# Patient Record
Sex: FEMALE | Race: WHITE | NOT HISPANIC OR LATINO | Employment: FULL TIME | URBAN - METROPOLITAN AREA
[De-identification: names, ages, dates, MRNs, and addresses within clinical notes are randomized per-mention and may not be internally consistent; named-entity substitution may affect disease eponyms.]

---
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
## 2019-07-05 ENCOUNTER — HOSPITAL ENCOUNTER (EMERGENCY)
Facility: CLINIC | Age: 19
Discharge: HOME | End: 2019-07-05
Attending: FAMILY MEDICINE | Admitting: FAMILY MEDICINE
Payer: COMMERCIAL

## 2019-07-05 ENCOUNTER — APPOINTMENT (OUTPATIENT)
Dept: CT IMAGING | Facility: CLINIC | Age: 19
End: 2019-07-05
Attending: FAMILY MEDICINE
Payer: COMMERCIAL

## 2019-07-05 VITALS
HEART RATE: 63 BPM | DIASTOLIC BLOOD PRESSURE: 80 MMHG | SYSTOLIC BLOOD PRESSURE: 120 MMHG | OXYGEN SATURATION: 100 % | RESPIRATION RATE: 12 BRPM | TEMPERATURE: 97 F

## 2019-07-05 DIAGNOSIS — S06.0X0A CONCUSSION WITHOUT LOSS OF CONSCIOUSNESS, INITIAL ENCOUNTER: ICD-10-CM

## 2019-07-05 PROCEDURE — 70450 CT HEAD/BRAIN W/O DYE: CPT

## 2019-07-05 PROCEDURE — 99284 EMERGENCY DEPT VISIT MOD MDM: CPT | Mod: 25 | Performed by: FAMILY MEDICINE

## 2019-07-05 PROCEDURE — 99284 EMERGENCY DEPT VISIT MOD MDM: CPT | Mod: Z6 | Performed by: FAMILY MEDICINE
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
## 2019-07-05 NOTE — ED AVS SNAPSHOT
[facility]  [address] [place] [address]  Phone:  [phone]  Fax:  [phone]                                    Mercedes Watts   MRN: [id number]    Department:  [facility]   Date of Visit:  7/5/2019           After Visit Summary Signature Page    I have received my discharge instructions, and my questions have been answered. I have discussed any challenges I see with this plan with the nurse or doctor.    ..........................................................................................................................................  Patient/Patient Representative Signature      ..........................................................................................................................................  Patient Representative Print Name and Relationship to Patient    ..................................................               ................................................  Date                                   Time    ..........................................................................................................................................  Reviewed by Signature/Title    ...................................................              ..............................................  Date                                               Time          22EPIC Rev 08/18
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
## 2019-07-23 ENCOUNTER — OFFICE VISIT (OUTPATIENT)
Dept: FAMILY MEDICINE | Facility: CLINIC | Age: 19
End: 2019-07-23
Payer: COMMERCIAL

## 2019-07-23 VITALS
WEIGHT: 139.7 LBS | OXYGEN SATURATION: 99 % | SYSTOLIC BLOOD PRESSURE: 116 MMHG | BODY MASS INDEX: 25.71 KG/M2 | RESPIRATION RATE: 22 BRPM | HEIGHT: 62 IN | TEMPERATURE: 98.7 F | HEART RATE: 110 BPM | DIASTOLIC BLOOD PRESSURE: 80 MMHG

## 2019-07-23 DIAGNOSIS — N94.6 DYSMENORRHEA: Primary | ICD-10-CM

## 2019-07-23 DIAGNOSIS — L70.9 ACNE, UNSPECIFIED ACNE TYPE: ICD-10-CM

## 2019-07-23 PROCEDURE — 99213 OFFICE O/P EST LOW 20 MIN: CPT | Performed by: NURSE PRACTITIONER

## 2019-07-23 RX ORDER — NORGESTIMATE AND ETHINYL ESTRADIOL 7DAYSX3 28
1 KIT ORAL DAILY
Qty: 84 TABLET | Refills: 4 | Status: SHIPPED | OUTPATIENT
Start: 2019-07-23 | End: 2020-08-17

## 2019-07-23 ASSESSMENT — MIFFLIN-ST. JEOR: SCORE: 1356.93

## 2019-07-23 ASSESSMENT — PAIN SCALES - GENERAL: PAINLEVEL: NO PAIN (0)

## 2019-07-23 NOTE — PROGRESS NOTES
"Subjective     Mercedes Watts is a 19 year old female who presents to clinic today for the following health issues:    HPI   Discuss starting on birth control for acne. Breakout increasing. Interested in oral tablets  She states her periods have also become heavier with increased cramping.  She presently denies sexual activity, has no concern for pregnancy.    BP Readings from Last 3 Encounters:   07/23/19 116/80   07/05/19 120/80   08/23/18 124/75    Wt Readings from Last 3 Encounters:   07/23/19 63.4 kg (139 lb 11.2 oz)   08/15/18 61.6 kg (135 lb 11.2 oz) (64 %)*     * Growth percentiles are based on CDC (Girls, 2-20 Years) data.                    Reviewed and updated as needed this visit by Provider         Review of Systems   ROS COMP: Constitutional, HEENT, cardiovascular, pulmonary, gi and gu systems are negative, except as otherwise noted.      Objective    /80   Pulse 110   Temp 98.7  F (37.1  C) (Temporal)   Resp 22   Ht 1.575 m (5' 2\")   Wt 63.4 kg (139 lb 11.2 oz)   SpO2 99%   BMI 25.55 kg/m    Body mass index is 25.55 kg/m .  Physical Exam   GENERAL: healthy, alert and no distress  NECK: no adenopathy, no asymmetry, masses, or scars and thyroid normal to palpation  RESP: lungs clear to auscultation - no rales, rhonchi or wheezes  CV: regular rate and rhythm, normal S1 S2, no S3 or S4, no murmur, click or rub, no peripheral edema and peripheral pulses strong  ABDOMEN: soft, nontender, no hepatosplenomegaly, no masses and bowel sounds normal  MS: no gross musculoskeletal defects noted, no edema    Diagnostic Test Results:  Labs reviewed in Epic  none         Assessment & Plan       ICD-10-CM    1. Dysmenorrhea N94.6 norgestim-eth estrad triphasic (ORTHO TRI-CYCLEN/TRI-SPRINTEC) 0.18/0.215/0.25 MG-35 MCG tablet   2. Acne, unspecified acne type L70.9 norgestim-eth estrad triphasic (ORTHO TRI-CYCLEN/TRI-SPRINTEC) 0.18/0.215/0.25 MG-35 MCG tablet        BMI:   Estimated body mass index is 25.55 " "kg/m  as calculated from the following:    Height as of this encounter: 1.575 m (5' 2\").    Weight as of this encounter: 63.4 kg (139 lb 11.2 oz).   Weight management plan: Discussed healthy diet and exercise guidelines      The patient has no contraindications to the use of hormonal contraceptives  We will start Ortho Tri-Cyclen 1 tablet daily.  Action of the medication, its effect on the menstrual cycle, and potential side effects complications were reviewed.  Appropriate use of the medication was reviewed.  She is aware this does not provide any protection against STDs if she were to be sexually active.  She is going to schedule an appointment for a wellness exam in 2 months and will at that time review her response to Ortho Tri-Cyclen.    No follow-ups on file.    KADE Nguyen [facility]    " No

## 2020-04-23 ENCOUNTER — OFFICE VISIT - HEALTHEAST (OUTPATIENT)
Dept: ALLERGY | Facility: CLINIC | Age: 20
End: 2020-04-23

## 2020-04-23 DIAGNOSIS — R06.02 SHORTNESS OF BREATH: ICD-10-CM

## 2020-04-23 DIAGNOSIS — J30.81 ALLERGIC RHINITIS DUE TO ANIMAL (CAT) (DOG) HAIR AND DANDER: ICD-10-CM

## 2020-04-23 DIAGNOSIS — J30.1 SEASONAL ALLERGIC RHINITIS DUE TO POLLEN: ICD-10-CM

## 2020-04-23 DIAGNOSIS — L20.84 INTRINSIC ECZEMA: ICD-10-CM

## 2020-08-16 ENCOUNTER — TELEPHONE (OUTPATIENT)
Dept: FAMILY MEDICINE | Facility: CLINIC | Age: 20
End: 2020-08-16

## 2020-08-16 DIAGNOSIS — N94.6 DYSMENORRHEA: ICD-10-CM

## 2020-08-16 DIAGNOSIS — L70.9 ACNE, UNSPECIFIED ACNE TYPE: ICD-10-CM

## 2020-08-16 NOTE — LETTER
[address] [address]  Phone: [phone]  Fax: [phone]        August 18, 2020      Mercedes Watts                                                                                                                                [address]  [place] [address]            Dear Ms. Watts,    We are concerned about your health care.  We recently provided you with a medication refill.  Many medications require routine follow-up with your Doctor.      At this time we ask that: You schedule an appointment for your annual physical and also Establish Care with provider.     Your prescription: Has been refilled for 1 month so you may have time for the above noted follow-up.      Thank you,      [facility]

## 2020-08-17 RX ORDER — NORGESTIMATE AND ETHINYL ESTRADIOL 7DAYSX3 28
1 KIT ORAL DAILY
Qty: 84 TABLET | Refills: 0 | Status: SHIPPED | OUTPATIENT
Start: 2020-08-17 | End: 2020-11-09

## 2020-08-17 NOTE — TELEPHONE ENCOUNTER
"  Requested Prescriptions   Pending Prescriptions Disp Refills     TRI FEMYNOR 0.18/0.215/0.25 MG-35 MCG tablet [Pharmacy Med Name: TRI FEMYNOR 28 TABLET] 84 tablet 4     Sig: TAKE 1 TABLET BY MOUTH EVERY DAY   Last Written Prescription Date:  7/23/2019  Last Fill Quantity: 84,  # refills: 4   Last office visit: 7/23/2019 with prescribing provider:     Future Office Visit:        Contraceptives Protocol Failed - 8/16/2020  9:15 AM        Failed - Recent (12 mo) or future (30 days) visit within the authorizing provider's specialty     Patient has had an office visit with the authorizing provider or a provider within the authorizing providers department within the previous 12 mos or has a future within next 30 days. See \"Patient Info\" tab in inbasket, or \"Choose Columns\" in Meds & Orders section of the refill encounter.            Passed - Patient is not a current smoker if age is 35 or older        Passed - Medication is active on med list        Passed - No active pregnancy on record        Passed - No positive pregnancy test in past 12 months         Medication is being filled for 1 time refill only due to:  Patient needs to be seen because it has been more than one year since last visit.  Sent to scheduling for patient to establish care with another provider/ due for yearly office visit for further refills    Sherice Azevedo RN      "

## 2020-11-08 DIAGNOSIS — L70.9 ACNE, UNSPECIFIED ACNE TYPE: ICD-10-CM

## 2020-11-08 DIAGNOSIS — N94.6 DYSMENORRHEA: ICD-10-CM

## 2020-11-08 NOTE — LETTER
November 10, 2020      Mercedes Watts  [address]  [place] [facility] [address]        Dear Mercedes,     You are due to be seen for your physical and recheck your meds.       Sincerely,        [facility]

## 2020-11-09 RX ORDER — NORGESTIMATE AND ETHINYL ESTRADIOL 7DAYSX3 28
KIT ORAL
Qty: 28 TABLET | Refills: 0 | Status: SHIPPED | OUTPATIENT
Start: 2020-11-09 | End: 2020-12-28

## 2020-11-09 NOTE — TELEPHONE ENCOUNTER
"Medication is being filled for 1 time refill only due to:  Patient needs to be seen because it has been more than one year since last visit. Already given message to schedule office visit/establish care in last prescription refill    Requested Prescriptions   Pending Prescriptions Disp Refills     TRI FEMYNOR 0.18/0.215/0.25 MG-35 MCG tablet [Pharmacy Med Name: TRI FEMYNOR 28 TABLET] 84 tablet 0     Sig: TAKE 1 TABLET BY MOUTH DAILY OFFICE VISIT DUE PRIOR TO FURTHER REFILLS   Last Written Prescription Date:  8/17/2020  Last Fill Quantity: 84,  # refills: 0   Last office visit: 7/23/2019 with prescribing provider:     Future Office Visit:        Contraceptives Protocol Failed - 11/8/2020  9:13 AM        Failed - Recent (12 mo) or future (30 days) visit within the authorizing provider's specialty     Patient has had an office visit with the authorizing provider or a provider within the authorizing providers department within the previous 12 mos or has a future within next 30 days. See \"Patient Info\" tab in inbasket, or \"Choose Columns\" in Meds & Orders section of the refill encounter.              Passed - Patient is not a current smoker if age is 35 or older        Passed - Medication is active on med list        Passed - No active pregnancy on record        Passed - No positive pregnancy test in past 12 months           Sending to scheduling for yearly office visit due/ establish with another provider    Sherice Azevedo RN      "

## 2020-12-28 ENCOUNTER — VIRTUAL VISIT (OUTPATIENT)
Dept: FAMILY MEDICINE | Facility: OTHER | Age: 20
End: 2020-12-28
Payer: COMMERCIAL

## 2020-12-28 DIAGNOSIS — N94.6 DYSMENORRHEA: ICD-10-CM

## 2020-12-28 DIAGNOSIS — L70.9 ACNE, UNSPECIFIED ACNE TYPE: ICD-10-CM

## 2020-12-28 PROCEDURE — 99213 OFFICE O/P EST LOW 20 MIN: CPT | Mod: 95 | Performed by: PHYSICIAN ASSISTANT

## 2020-12-28 RX ORDER — NORGESTIMATE AND ETHINYL ESTRADIOL 7DAYSX3 28
KIT ORAL
Qty: 84 TABLET | Refills: 3 | Status: SHIPPED | OUTPATIENT
Start: 2020-12-28 | End: 2021-09-14

## 2020-12-28 NOTE — PROGRESS NOTES
"Mercedes Watts is a 20 year old female who is being evaluated via a billable video visit.      The patient has been notified of following:     \"This video visit will be conducted via a call between you and your physician/provider. We have found that certain health care needs can be provided without the need for an in-person physical exam.  This service lets us provide the care you need with a video conversation.  If a prescription is necessary we can send it directly to your pharmacy.  If lab work is needed we can place an order for that and you can then stop by our lab to have the test done at a later time.    Video visits are billed at different rates depending on your insurance coverage.  Please reach out to your insurance provider with any questions.    If during the course of the call the physician/provider feels a video visit is not appropriate, you will not be charged for this service.\"    Patient has given verbal consent for Video visit? Yes  How would you like to obtain your AVS? MyChart  If you are dropped from the video visit, the video invite should be resent to: Text to cell phone: [phone]  Will anyone else be joining your video visit? No      Subjective     Mercedes Watts is a 20 year old female who presents today via video visit for the following health issues:    HPI      Birth Control Refill   LMP: 12/1/2020    No breakthrough or irregular bleeding, periods are consistent and on time.     Uses to control her acne as well as for birth control.   Uses Condoms for STD protection, no concerns about STDs at this time.         Video Start Time: 10:54 AM      Review of Systems   Constitutional, HEENT, cardiovascular, pulmonary, gi and gu systems are negative, except as otherwise noted.      Objective           Vitals:  No vitals were obtained today due to virtual visit.    Physical Exam     GENERAL: Healthy, alert and no distress  EYES: Eyes grossly normal to inspection.  No discharge or erythema, " or obvious scleral/conjunctival abnormalities.  RESP: No audible wheeze, cough, or visible cyanosis.  No visible retractions or increased work of breathing.    SKIN: Visible skin clear. No significant rash, abnormal pigmentation or lesions.  NEURO: Cranial nerves grossly intact.  Mentation and speech appropriate for age.  PSYCH: Mentation appears normal, affect normal/bright, judgement and insight intact, normal speech and appearance well-groomed.      No results found for this or any previous visit (from the past 24 hour(s)).        Assessment & Plan     Mercedes was seen today for recheck medication.    Diagnoses and all orders for this visit:    Dysmenorrhea  -     norgestim-eth estrad triphasic (TRI FEMYNOR) 0.18/0.215/0.25 MG-35 MCG tablet; Take 1 tablet daily    Acne, unspecified acne type  -     norgestim-eth estrad triphasic (TRI FEMYNOR) 0.18/0.215/0.25 MG-35 MCG tablet; Take 1 tablet daily            Doing well on OCP  Recommended STD screening and pap smear, she is due for Tdap as of July, she will schedule sometime after that.   Refilled OCP for a year.     Return in about 7 months (around 7/28/2021) for Physical Exam.     Options for treatment and follow-up care were reviewed with the patient and/or guardian. Patient and/or guardian engaged in the decision making process and verbalized understanding of the options discussed and agreed with the final plan.     Bennett Foy PA-C  [facility]      Video-Visit Details    Type of service:  Video Visit    Video End Time:11:01 AM    Originating Location (pt. Location): Home    Distant Location (provider location):  [facility]     Platform used for Video Visit: Pedro

## 2021-01-15 ENCOUNTER — HEALTH MAINTENANCE LETTER (OUTPATIENT)
Age: 21
End: 2021-01-15

## 2021-02-04 ASSESSMENT — ENCOUNTER SYMPTOMS
BREAST MASS: 0
ARTHRALGIAS: 0
HEADACHES: 0
HEARTBURN: 0
HEMATURIA: 0
JOINT SWELLING: 0
HEMATOCHEZIA: 0
CONSTIPATION: 0
PALPITATIONS: 0
SORE THROAT: 0
FREQUENCY: 0
NAUSEA: 0
COUGH: 0
MYALGIAS: 0
DIZZINESS: 0
NERVOUS/ANXIOUS: 0
CHILLS: 0
SHORTNESS OF BREATH: 0
DYSURIA: 0
PARESTHESIAS: 0
ABDOMINAL PAIN: 0
WEAKNESS: 0
DIARRHEA: 0
EYE PAIN: 0
FEVER: 0

## 2021-02-05 NOTE — PROGRESS NOTES
" SUBJECTIVE:   CC: Mercedes Watts is an 21 year old woman who presents for preventive health visit.     {Split Bill scripting  The purpose of this visit is to discuss your medical history and prevent health problems before you are sick. You may be responsible for a co-pay, coinsurance, or deductible if your visit today includes services such as checking on a sore throat, having an x-ray or lab test, or treating and evaluating a new or existing condition :404094}  Patient has been advised of split billing requirements and indicates understanding: {Yes and No:947304}    {Outside tests to abstract? :531710}    {additional problems to add (Optional):893222}    Today's PHQ-2 Score:   PHQ-2 ( 1999 [employer]) 2/4/2021 12/28/2020   Q1: Little interest or pleasure in doing things 0 0   Q2: Feeling down, depressed or hopeless 0 0   PHQ-2 Score 0 0   Q1: Little interest or pleasure in doing things Not at all -   Q2: Feeling down, depressed or hopeless Not at all -   PHQ-2 Score 0 -     {PHQ-2 LOOK IN ASSESSMENTS (Optional) :776364}  Abuse: Current or Past(Physical, Sexual or Emotional)- {YES/NO/NA:239118}  Do you feel safe in your environment? {YES/NO/NA:727917}        Social History     Tobacco Use     Smoking status: Never Smoker     Smokeless tobacco: Never Used   Substance Use Topics     Alcohol use: No     If you drink alcohol do you typically have >3 drinks per day or >7 drinks per week? {ETOH :674257}                     Reviewed orders with patient.  Reviewed health maintenance and updated orders accordingly - {Yes/No:411414::\"Yes\"}  {Chronicprobdata (Optional):715698}        Pertinent mammograms are reviewed under the imaging tab.  History of abnormal Pap smear: {PAP HX:172798}     Reviewed and updated as needed this visit by clinical staff                 Reviewed and updated as needed this visit by Provider                {HISTORY OPTIONS (Optional):798213}    ROS:  { :589962}    OBJECTIVE:   There were no vitals " "taken for this visit.  EXAM:  {Exam Choices:278713}    {Diagnostic Test Results (Optional):852245::\"Diagnostic Test Results:\",\"Labs reviewed in Epic\"}    ASSESSMENT/PLAN:   {Diag Picklist:310678}    Patient has been advised of split billing requirements and indicates understanding: {YES / NO:064958::\"Yes\"}  COUNSELING:   {FEMALE COUNSELING MESSAGES:688432::\"Reviewed preventive health counseling, as reflected in patient instructions\"}    Estimated body mass index is 25.55 kg/m  as calculated from the following:    Height as of 7/23/19: 1.575 m (5' 2\").    Weight as of 7/23/19: 63.4 kg (139 lb 11.2 oz).    {Weight Management Plan (ACO) Complete if BMI is abnormal-  Ages 18-64  BMI >24.9.  Age 65+ with BMI <23 or >30 (Optional):148587}    She reports that she has never smoked. She has never used smokeless tobacco.      Counseling Resources:  ATP IV Guidelines  Pooled Cohorts Equation Calculator  Breast Cancer Risk Calculator  BRCA-Related Cancer Risk Assessment: FHS-7 Tool  FRAX Risk Assessment  ICSI Preventive Guidelines  Dietary Guidelines for Americans, 2010  [employer]'s MyPlate  ASA Prophylaxis  Lung CA Screening    LIAN Jang [facility]  Answers for HPI/ROS submitted by the patient on 2/4/2021   Annual Exam:  Frequency of exercise:: 6-7 days/week  Getting at least 3 servings of Calcium per day:: NO  Diet:: Other  Taking medications regularly:: Yes  Medication side effects:: None  Bi-annual eye exam:: Yes  Dental care twice a year:: Yes  Sleep apnea or symptoms of sleep apnea:: None  abdominal pain: No  Blood in stool: No  Blood in urine: No  chest pain: No  chills: No  congestion: No  constipation: No  cough: No  diarrhea: No  dizziness: No  ear pain: No  eye pain: No  nervous/anxious: No  fever: No  frequency: No  genital sores: No  headaches: No  hearing loss: No  heartburn: No  arthralgias: No  joint swelling: No  peripheral edema: No  mood changes: No  myalgias: No  nausea: " No  dysuria: No  palpitations: No  Skin sensation changes: No  sore throat: No  urgency: No  rash: No  shortness of breath: No  visual disturbance: No  weakness: No  pelvic pain: No  vaginal bleeding: No  vaginal discharge: No  tenderness: No  breast mass: No  breast discharge: No  Additional concerns today:: No  Duration of exercise:: 30-45 minutes

## 2021-02-09 ENCOUNTER — OFFICE VISIT (OUTPATIENT)
Dept: FAMILY MEDICINE | Facility: OTHER | Age: 21
End: 2021-02-09
Payer: COMMERCIAL

## 2021-02-09 VITALS
TEMPERATURE: 98.6 F | HEART RATE: 85 BPM | HEIGHT: 62 IN | DIASTOLIC BLOOD PRESSURE: 74 MMHG | BODY MASS INDEX: 24.48 KG/M2 | OXYGEN SATURATION: 100 % | WEIGHT: 133 LBS | SYSTOLIC BLOOD PRESSURE: 112 MMHG

## 2021-02-09 DIAGNOSIS — Z11.3 SCREEN FOR STD (SEXUALLY TRANSMITTED DISEASE): ICD-10-CM

## 2021-02-09 DIAGNOSIS — Z00.00 ROUTINE GENERAL MEDICAL EXAMINATION AT A HEALTH CARE FACILITY: Primary | ICD-10-CM

## 2021-02-09 DIAGNOSIS — Z12.4 SCREENING FOR MALIGNANT NEOPLASM OF CERVIX: ICD-10-CM

## 2021-02-09 LAB
SPECIMEN SOURCE: ABNORMAL
WET PREP SPEC: ABNORMAL

## 2021-02-09 PROCEDURE — 87491 CHLMYD TRACH DNA AMP PROBE: CPT | Performed by: PHYSICIAN ASSISTANT

## 2021-02-09 PROCEDURE — G0145 SCR C/V CYTO,THINLAYER,RESCR: HCPCS | Performed by: PHYSICIAN ASSISTANT

## 2021-02-09 PROCEDURE — 99395 PREV VISIT EST AGE 18-39: CPT | Performed by: PHYSICIAN ASSISTANT

## 2021-02-09 PROCEDURE — 87210 SMEAR WET MOUNT SALINE/INK: CPT | Performed by: PHYSICIAN ASSISTANT

## 2021-02-09 PROCEDURE — 87591 N.GONORRHOEAE DNA AMP PROB: CPT | Performed by: PHYSICIAN ASSISTANT

## 2021-02-09 ASSESSMENT — ENCOUNTER SYMPTOMS
WEAKNESS: 0
FREQUENCY: 0
FEVER: 0
SHORTNESS OF BREATH: 0
HEARTBURN: 0
HEMATOCHEZIA: 0
DYSURIA: 0
NERVOUS/ANXIOUS: 0
PALPITATIONS: 0
HEMATURIA: 0
JOINT SWELLING: 0
ABDOMINAL PAIN: 0
COUGH: 0
CONSTIPATION: 0
BREAST MASS: 0
ARTHRALGIAS: 0
CHILLS: 0
HEADACHES: 0
DIARRHEA: 0
SORE THROAT: 0
MYALGIAS: 0
PARESTHESIAS: 0
EYE PAIN: 0
NAUSEA: 0
DIZZINESS: 0

## 2021-02-09 ASSESSMENT — MIFFLIN-ST. JEOR: SCORE: 1308.59

## 2021-02-09 NOTE — PROGRESS NOTES
SUBJECTIVE:   CC: Mercedes Watts is an 21 year old woman who presents for preventive health visit.   Patient has been advised of split billing requirements and indicates understanding: Yes  Healthy Habits:     Getting at least 3 servings of Calcium per day:  NO    Bi-annual eye exam:  Yes    Dental care twice a year:  Yes    Sleep apnea or symptoms of sleep apnea:  None    Diet:  Other    Frequency of exercise:  6-7 days/week    Duration of exercise:  30-45 minutes    Taking medications regularly:  Yes    Medication side effects:  None    PHQ-2 Total Score: 0    Additional concerns today:  Yes (increased cramping during period. Heavier bleeding/nausea)    - Her most recent menstrual cycle started off normal, light period but then towards the end had severe cramping, diarrhea, nausea, and heavy bleeding for about 1-2 days.  Had this about 3 months ago.  Used to have heavy bleeding and cramping prior to being on OCP.  Mother and aunt both had to have hysterectomies due to heavy bleeding, no cancer concerns. She takes medication regularly.  No recent medication changes, supplements, diet changes, stress change.s     Today's PHQ-2 Score:   PHQ-2 ( 1999 Pfizer) 2/4/2021   Q1: Little interest or pleasure in doing things 0   Q2: Feeling down, depressed or hopeless 0   PHQ-2 Score 0   Q1: Little interest or pleasure in doing things Not at all   Q2: Feeling down, depressed or hopeless Not at all   PHQ-2 Score 0     Abuse: Current or Past (Physical, Sexual or Emotional) - No  Do you feel safe in your environment? Yes    Social History     Tobacco Use     Smoking status: Never Smoker     Smokeless tobacco: Never Used   Substance Use Topics     Alcohol use: Yes     If you drink alcohol do you typically have >3 drinks per day or >7 drinks per week? No    Alcohol Use 2/4/2021   Prescreen: >3 drinks/day or >7 drinks/week? No   No flowsheet data found.      Reviewed orders with patient.  Reviewed health maintenance and updated  orders accordingly - Yes  BP Readings from Last 3 Encounters:   02/09/21 112/74   07/23/19 116/80   07/05/19 120/80    Wt Readings from Last 3 Encounters:   02/09/21 60.3 kg (133 lb)   07/23/19 63.4 kg (139 lb 11.2 oz)   08/15/18 61.6 kg (135 lb 11.2 oz) (64 %, Z= 0.35)*     * Growth percentiles are based on [facility] (Girls, 2-20 Years) data.                  Patient Active Problem List   Diagnosis     Rash     Past Surgical History:   Procedure Laterality Date     NO HISTORY OF SURGERY         Social History     Tobacco Use     Smoking status: Never Smoker     Smokeless tobacco: Never Used   Substance Use Topics     Alcohol use: Yes     Family History   Problem Relation Age of Onset     No Known Problems Mother      No Known Problems Father      No Known Problems Maternal Grandmother      Hypertension Maternal Grandfather      Pulmonary Embolism Paternal Grandmother      No Known Problems Paternal Grandfather      No Known Problems Brother      No Known Problems Sister      No Known Problems Son      No Known Problems Daughter      No Known Problems Maternal Half-Brother      No Known Problems Maternal Half-Sister      No Known Problems Paternal Half-Brother      No Known Problems Paternal Half-Sister      No Known Problems Niece      No Known Problems Nephew      Diabetes Cousin      No Known Problems Other          Current Outpatient Medications   Medication Sig Dispense Refill     loratadine (CLARITIN) 10 MG tablet Take 10 mg by mouth daily       norgestim-eth estrad triphasic (TRI FEMYNOR) 0.18/0.215/0.25 MG-35 MCG tablet Take 1 tablet daily 84 tablet 3     Allergies   Allergen Reactions     Pollen Extract Hives     Sulfa Drugs Hives           History of abnormal Pap smear: NO - age 21-29 PAP every 3 years recommended     Reviewed and updated as needed this visit by clinical staff  Tobacco  Allergies  Meds   Med Hx  Surg Hx  Fam Hx  Soc Hx        Reviewed and updated as needed this visit by Provider           "        Review of Systems   Constitutional: Negative for chills and fever.   HENT: Negative for congestion, ear pain, hearing loss and sore throat.    Eyes: Negative for pain and visual disturbance.   Respiratory: Negative for cough and shortness of breath.    Cardiovascular: Negative for chest pain, palpitations and peripheral edema.   Gastrointestinal: Negative for abdominal pain, constipation, diarrhea, heartburn, hematochezia and nausea.   Breasts:  Negative for tenderness, breast mass and discharge.   Genitourinary: Negative for dysuria, frequency, genital sores, hematuria, pelvic pain, urgency, vaginal bleeding and vaginal discharge.   Musculoskeletal: Negative for arthralgias, joint swelling and myalgias.   Skin: Negative for rash.   Neurological: Negative for dizziness, weakness, headaches and paresthesias.   Psychiatric/Behavioral: Negative for mood changes. The patient is not nervous/anxious.        OBJECTIVE:   /74   Pulse 85   Temp 98.6  F (37  C) (Temporal)   Ht 1.562 m (5' 1.5\")   Wt 60.3 kg (133 lb)   LMP 02/01/2021 (Approximate)   SpO2 100%   BMI 24.72 kg/m    Physical Exam  GENERAL: healthy, alert and no distress  EYES: Eyes grossly normal to inspection, PERRL and conjunctivae and sclerae normal  HENT: ear canals and TM's normal, nose and mouth without ulcers or lesions  NECK: no adenopathy, no asymmetry, masses, or scars and thyroid normal to palpation  RESP: lungs clear to auscultation - no rales, rhonchi or wheezes  BREAST: normal without masses, tenderness or nipple discharge and no palpable axillary masses or adenopathy  CV: regular rate and rhythm, normal S1 S2, no S3 or S4, no murmur, click or rub, no peripheral edema and peripheral pulses strong  ABDOMEN: soft, nontender, no hepatosplenomegaly, no masses and bowel sounds normal   (female): normal female external genitalia, normal urethral meatus, vaginal mucosa pink, moist, well rugated, and normal cervix/adnexa/uterus without " "masses or discharge  MS: no gross musculoskeletal defects noted, no edema  SKIN: no suspicious lesions or rashes  NEURO: Normal strength and tone, mentation intact and speech normal  PSYCH: mentation appears normal, affect normal/bright    Diagnostic Test Results:  Labs reviewed in Epic  Results for orders placed or performed in visit on 02/09/21 (from the past 24 hour(s))   Wet prep    Specimen: Vagina   Result Value Ref Range    Specimen Description Vagina     Wet Prep Few  Yeast seen   (A)     Wet Prep Few  WBC'S seen       Wet Prep No Trichomonas seen     Wet Prep No clue cells seen        ASSESSMENT/PLAN:   Mercedes was seen today for physical.    Diagnoses and all orders for this visit:    Routine general medical examination at a health care facility    Screening for malignant neoplasm of cervix  -     Pap imaged thin layer screen only - recommended age 21 - 24 years    Screen for STD (sexually transmitted disease)  -     Wet prep  -     Neisseria gonorrhoeae PCR  -     Chlamydia trachomatis PCR      - Screening for STDs, declines need for HIV/Hep C.   - Updated pap smear.   - She will monitor her heavy bleeding and cramping, if this becomes regular concern then consider switching progesterone or lowering her estrogen dose of her OCP.  She will update via [facility] if persistent issue.  Recommended NSAID use with her cycle.     Patient has been advised of split billing requirements and indicates understanding: Yes  COUNSELING:  Reviewed preventive health counseling, as reflected in patient instructions    Estimated body mass index is 24.72 kg/m  as calculated from the following:    Height as of this encounter: 1.562 m (5' 1.5\").    Weight as of this encounter: 60.3 kg (133 lb).        She reports that she has never smoked. She has never used smokeless tobacco.      Counseling Resources:  ATP IV Guidelines  Pooled Cohorts Equation Calculator  Breast Cancer Risk Calculator  BRCA-Related Cancer Risk Assessment: " FHS-7 Tool  FRAX Risk Assessment  ICSI Preventive Guidelines  Dietary Guidelines for Americans, 2010  USDA's MyPlate  ASA Prophylaxis  Lung CA Screening    LIAN Jang [facility]

## 2021-02-10 LAB
C TRACH DNA SPEC QL NAA+PROBE: NEGATIVE
N GONORRHOEA DNA SPEC QL NAA+PROBE: NEGATIVE
SPECIMEN SOURCE: NORMAL
SPECIMEN SOURCE: NORMAL

## 2021-02-11 LAB
COPATH REPORT: NORMAL
PAP: NORMAL

## 2021-03-30 ENCOUNTER — OFFICE VISIT (OUTPATIENT)
Dept: ALLERGY | Facility: OTHER | Age: 21
End: 2021-03-30
Payer: COMMERCIAL

## 2021-03-30 VITALS
SYSTOLIC BLOOD PRESSURE: 120 MMHG | OXYGEN SATURATION: 97 % | DIASTOLIC BLOOD PRESSURE: 86 MMHG | WEIGHT: 130 LBS | BODY MASS INDEX: 23.92 KG/M2 | HEIGHT: 62 IN | HEART RATE: 78 BPM

## 2021-03-30 DIAGNOSIS — J30.81 ALLERGIC RHINITIS DUE TO ANIMAL HAIR AND DANDER: Primary | ICD-10-CM

## 2021-03-30 DIAGNOSIS — J30.89 ALLERGIC RHINITIS DUE TO DUST MITE: ICD-10-CM

## 2021-03-30 DIAGNOSIS — L20.84 INTRINSIC ECZEMA: ICD-10-CM

## 2021-03-30 DIAGNOSIS — J30.1 SEASONAL ALLERGIC RHINITIS DUE TO POLLEN: ICD-10-CM

## 2021-03-30 DIAGNOSIS — L50.9 HIVES: ICD-10-CM

## 2021-03-30 PROCEDURE — 99214 OFFICE O/P EST MOD 30 MIN: CPT | Mod: 25 | Performed by: ALLERGY & IMMUNOLOGY

## 2021-03-30 PROCEDURE — 95004 PERQ TESTS W/ALRGNC XTRCS: CPT | Performed by: ALLERGY & IMMUNOLOGY

## 2021-03-30 RX ORDER — TRIAMCINOLONE ACETONIDE 1 MG/G
CREAM TOPICAL 2 TIMES DAILY
Qty: 454 G | Refills: 1 | Status: SHIPPED | OUTPATIENT
Start: 2021-03-30 | End: 2024-08-27

## 2021-03-30 RX ORDER — AZELASTINE 1 MG/ML
2 SPRAY, METERED NASAL 2 TIMES DAILY
Qty: 30 ML | Refills: 3 | Status: SHIPPED | OUTPATIENT
Start: 2021-03-30

## 2021-03-30 ASSESSMENT — MIFFLIN-ST. JEOR: SCORE: 1294.99

## 2021-03-30 NOTE — LETTER
3/30/2021         RE: Mercedes Watts  [address] [place] [address]        Dear Colleague,    Thank you for referring your patient, Mercedes Watts, to the [facility]. Please see a copy of my visit note below.    Mercedes Watts is a 21 year old White female with previous medical history significant for hives, allergic rhinitis who returns for a follow up visit.     Patient has a history of atopic dermatitis.  This involves behind her legs, armpits and hands.  Recently she has developed erythematous, raised, pruritic welts on her lower extremities.  This progressed to involve her entire body.  She would have 1 to 3 days and then hives would resolve.  No scarring or discoloration.  She has been hive free for the last 1 week.  Hives started in January.  No clear trigger including new medications, soaps, shampoos, over-the-counter medications.  Heat caused her to have increased hives.  She has had hives off and on throughout her life.  She has rhinorrhea, sneezing, congestion, postnasal drainage, ocular itching and watering in the spring.  Increase symptoms around cats.  Previously on allergy shots.  Shots were beneficial.  Last spring she had chest symptoms and was started on Qvar.  This was helpful.  She is currently off.  She denies any current wheezing, shortness of breath or tightness in chest.      Past Medical History:   Diagnosis Date     Chicken pox      Shingles      Family History   Problem Relation Age of Onset     No Known Problems Mother      No Known Problems Father      No Known Problems Maternal Grandmother      Hypertension Maternal Grandfather      Pulmonary Embolism Paternal Grandmother      No Known Problems Paternal Grandfather      No Known Problems Brother      No Known Problems Sister      No Known Problems Son      No Known Problems Daughter      No Known Problems Maternal Half-Brother      No Known Problems Maternal Half-Sister      No Known Problems  Paternal Half-Brother      No Known Problems Paternal Half-Sister      No Known Problems Niece      No Known Problems Nephew      Diabetes Cousin      No Known Problems Other      Past Surgical History:   Procedure Laterality Date     NO HISTORY OF SURGERY         REVIEW OF SYSTEMS:  Nose: negative for snoring.negative for changes in smell. positive  for drainage.   Eyes: negative for eye watering. negative for eye itching. negative for vision changes. negative for eye redness.  Throat: negative for hoarseness. negative for sore throat. negative for trouble swallowing.   Lungs: negative for shortness of breath.negative for wheezing. negative for sputum production.   Integumentary: positive  for rash. negative for scaling. negative for nail changes.       Current Outpatient Medications:      azelastine (ASTELIN) 0.1 % nasal spray, Spray 2 sprays into both nostrils 2 times daily, Disp: 30 mL, Rfl: 3     loratadine (CLARITIN) 10 MG tablet, Take 10 mg by mouth daily, Disp: , Rfl:      norgestim-eth estrad triphasic (TRI FEMYNOR) 0.18/0.215/0.25 MG-35 MCG tablet, Take 1 tablet daily, Disp: 84 tablet, Rfl: 3     triamcinolone (KENALOG) 0.1 % external cream, Apply topically 2 times daily, Disp: 454 g, Rfl: 1  Immunization History   Administered Date(s) Administered     DTAP (<7y) 02/03/2004     DTaP, Unspecified 07/11/2011     FLU 6-35 months 10/10/2012     HPV Quadrivalent 10/06/2011, 08/30/2012, 09/09/2013     HepA-ped 2 Dose 07/11/2011, 08/30/2012     HepB, Unspecified 1999, 1999, 1999     Influenza (IIV3) PF 11/12/2007, 10/06/2011     Influenza (intradermal) 10/10/2012     Influenza,INJ,MDCK,PF,Quad >4yrs 10/26/2018, 11/22/2020     MMR 02/03/2000, 02/03/2004     Meningococcal (Menactra ) 07/11/2011, 08/15/2018     Pneumococcal, Unspecified 08/22/2000     Polio, Unspecified  1999, 1999, 1999, 02/03/2004     TRIHIBIT (DTAP/HIB, <7y) 1999, 1999, 1999, 08/22/2000      Varicella 02/03/2000     Allergies   Allergen Reactions     Cats Itching     Pollen Extract Hives     Sulfa Drugs Hives         EXAM:   Constitutional:  Appears well-developed and well-nourished. No distress.   HEENT:   Head: Normocephalic.   No cobblestoning of posterior oropharynx.   Nasal tissue pink and normal appearing.  No rhinorrhea noted.    Eyes: Conjunctivae are non-erythematous   Cardiovascular: Normal rate, regular rhythm and normal heart sounds. Exam reveals no gallop and no friction rub.   No murmur heard.  Respiratory: Effort normal and breath sounds normal. No respiratory distress. No wheezes. No rales.   Musculoskeletal: Normal range of motion.   Neuro: Oriented to person, place, and time.  Skin: Skin is warm and dry. No rash noted.   Psychiatric: Normal mood and affect.     Nursing note and vitals reviewed.    WORKUP:   ENVIRONMENTAL PERCUTANEOUS SKIN TESTING: ADULT  [place] [facility] 3/30/2021   Consent Y   Ordering Physician Dr. Chaidez   Interpreting Physician Dr. Chaidez   Testing Technician al/sg   Location Back   Time start:  8:00 AM   Time End:  8:15 AM   Positive Control: Histatrol*ALK 1 mg/ml 5/20   Negative Control: 50% Glycerin 0   Cat Hair*ALK (10,000 BAU/ml) 6/25   AP Dog Hair/Dander (1:100 w/v) 0   Dust Mite p. 30,000 AU/ml 5/20   Dust Mite f. (30,000 AU/ml) 3/15   Jonnie (W/F in millimeters) 5/25   Nadre Grass (100,000 BAU/mL) 5/25   Red [place] (W/F in millimeters) 4/6   Maple/Lincoln (W/F in millimeters) 7/20   Hackberry (W/F in millimeters) 7/15   [place] (W/F in millimeters) 6/20   L[place]dale *ALK (W/F in millimeters) 0   American Elm (W/F in millimeters) 6/20   Mondovi (W/F in millimeters) 4/20   Black [place]ander (W/F in millimeters) 5/20   Birch Mix (W/F in millimeters) 4/25   Colmar (W/F in millimeters) 3/4   [place] (W/F in millimeters) 4/20   Cocklebur (W/F in millimeters) 0   Holloway (W/F in millimeters) 5/20   White [place] (W/F in millimeters) 6/20   Careless (W/F in  millimeters) 5/20   Nettle (W/F in millimeters) 0   English Plantain (W/F in millimeters) 3/3   Kochia (W/F in millimeters) 3/20   Lamb's Quarter (W/F in millimeters) 3/10   Marshelder (W/F in millimeters) 0   Ragweed Mix* ALK (W/F in millimeters) 3/3   Russian Thistle (W/F in millimeters) 4/20   Sagebrush/Mugwort (W/F in millimeters) 0   Sheep Sorrel (W/F in millimeters) 3/5   Feather Mix* ALK (W/F in millimeters) 0   Penicillium Mix (1:10 w/v) 0   Curvularia spicifera (1:10 w/v) 4/10   Epicoccum (1:10 w/v) 0   Aspergillus fumigatus (1:10 w/v): 0   Alternaria tenius (1:10 w/v) 10/30   H. Cladosporium (1:10 w/v) 0   Phoma herbarum (1:10 w/v) 0        ASSESSMENT/PLAN:  Problem List Items Addressed This Visit        Respiratory    Allergic rhinitis due to animal hair and dander - Primary    Relevant Medications    azelastine (ASTELIN) 0.1 % nasal spray    Other Relevant Orders    ALLERGY SKIN TESTS,ALLERGENS [41457] (Completed)    Seasonal allergic rhinitis due to pollen     Spring nasal and ocular symptoms.  Increase symptoms around cats.    Skin testing positive for cats, dust mites, grass, trees, weeds, molds.    -Allergen avoidance measures discussed and literature provided.  -Astelin 2 sprays per nostril twice daily as needed.  -Xyzal, Zyrtec, or Allegra 1 to 4 tablets daily as needed.         Relevant Medications    azelastine (ASTELIN) 0.1 % nasal spray    Other Relevant Orders    ALLERGY SKIN TESTS,ALLERGENS [[id number]] (Completed)    Allergic rhinitis due to dust mite    Relevant Medications    azelastine (ASTELIN) 0.1 % nasal spray    Other Relevant Orders    ALLERGY SKIN TESTS,ALLERGENS [[id number]] (Completed)       Musculoskeletal and Integumentary    Intrinsic eczema     History of atopic dermatitis.  No recent use of topical corticosteroid.  Allergy testing as noted.    - Eczema treatment instructions were discussed with the patient and literature provided.    - Daily bathing.   - Use of thick emollient such as  Eucerin, Aquaphor, Vanicream or Vaseline 2-3 times daily.   - Soak and seal technique was discussed.    - Avoid harsh soaps and detergents. Use fragrance free.    - Triamcinolone 0.1% cream bid to active eczema on body.    - Hydrocortisone 1% cream bid to active eczema on face.              Relevant Medications    azelastine (ASTELIN) 0.1 % nasal spray    triamcinolone (KENALOG) 0.1 % external cream    Other Relevant Orders    ALLERGY SKIN TESTS,ALLERGENS [[id number]] (Completed)    Hives     Hives that began in January.  No hives over the last week.  Hives would persist anywhere from 1 to 3 days.  Hives are pruritic.  Heat made worse.    -If hives return start either Zyrtec, Allegra or Xyzal 1 to 4 tablets daily.  Previously on Claritin and still having hives.  No clear etiology of symptoms including medications, new soaps, shampoos, detergents, over-the-counter medications.         Relevant Medications    azelastine (ASTELIN) 0.1 % nasal spray    triamcinolone (KENALOG) 0.1 % external cream    Other Relevant Orders    ALLERGY SKIN TESTS,ALLERGENS [[id number]] (Completed)          Chart documentation with Dragon Voice recognition Software. Although reviewed after completion, some words and grammatical errors may remain.    Scot Chaidez DO FAAAAI  Medical Director for Allergy/Immunology at [place], [place]        Again, thank you for allowing me to participate in the care of your patient.        Sincerely,        Scot Chaidez DO

## 2021-03-30 NOTE — ASSESSMENT & PLAN NOTE
History of atopic dermatitis.  No recent use of topical corticosteroid.  Allergy testing as noted.    - Eczema treatment instructions were discussed with the patient and literature provided.    - Daily bathing.   - Use of thick emollient such as Eucerin, Aquaphor, Vanicream or Vaseline 2-3 times daily.   - Soak and seal technique was discussed.    - Avoid harsh soaps and detergents. Use fragrance free.    - Triamcinolone 0.1% cream bid to active eczema on body.    - Hydrocortisone 1% cream bid to active eczema on face.

## 2021-03-30 NOTE — ASSESSMENT & PLAN NOTE
Spring nasal and ocular symptoms.  Increase symptoms around cats.    Skin testing positive for cats, dust mites, grass, trees, weeds, molds.    -Allergen avoidance measures discussed and literature provided.  -Astelin 2 sprays per nostril twice daily as needed.  -Xyzal, Zyrtec, or Allegra 1 to 4 tablets daily as needed.

## 2021-03-30 NOTE — PATIENT INSTRUCTIONS
Allergy Staff Appt Hours Shot Hours Locations    Physician     Scot Chaidez DO       Support Staff     Sandi HAMM RN      [place] JEAN CMA  Tuesday:        [place] 7-5 Wednesday:        [place]: 7-5 Thursday:                    [place] 7-6     Friday:  [place]  7-2   [place]        Thursday: 8-5:20        Friday: 7-12     [place]        Tuesday: 7- 3:20 Wednesday: 7-4:20     [place] Monday: 7-2:20 Tuesday: 9-5:20         [facility]  [address] [place] [address]  Appt Line: [phone]  Allergy RN:  [phone]    [facility]  [address] [place] [address]  Appt Line: [phone]  Allergy RN:  [phone]       Important Scheduling Information  Aspirin Desensitization: Appt will last 2 clinic days. Please call the Allergy RN line for your clinic to schedule. Discontinue antihistamines 7 days prior to the appointment.     Food Challenges: Appt will last 3-4 hours. Please call the Allergy RN line for your clinic to schedule. Discontinue antihistamines 7 days prior to the appointment.     Penicillin Testing: Appt will last 2-3 hours. Please call the Allergy RN line for your clinic to schedule. Discontinue antihistamines 7 days prior to the appointment.     Skin Testing: Appt will about 40 minutes. Call the appointment line for your clinic to schedule. Discontinue antihistamines 7 days prior to the appointment.     Venom Testing: Appt will last 2-3 hours. Please call the Allergy RN line for your clinic to schedule. Discontinue antihistamines 7 days prior to the appointment.     Thank you for trusting us with your Allergy, Asthma, and Immunology care. Please feel free to contact us with any questions or concerns you may have.      ENVIRONMENTAL PERCUTANEOUS SKIN TESTING: ADULT  [facility] 3/30/2021   Consent Y   Ordering Physician Dr. Chaidez   Interpreting Physician Dr. Chaidez   Testing Technician al/sg   Location Back   Time start:  8:00 AM    Time End:  8:15 AM   Positive Control: Histatrol*ALK 1 mg/ml 5/20   Negative Control: 50% Glycerin 0   Cat Hair*ALK (10,000 BAU/ml) 6/25   AP Dog Hair/Dander (1:100 w/v) 0   Dust Mite p. 30,000 AU/ml 5/20   Dust Mite f. (30,000 AU/ml) 3/15   Jonnie (W/F in millimeters) 5/25   Andre Grass (100,000 BAU/mL) 5/25   Red [place] (W/F in millimeters) 4/6   Maple/Hamlet (W/F in millimeters) 7/20   Hackberry (W/F in millimeters) 7/15   Rhea (W/F in millimeters) 6/20   [place] *ALK (W/F in millimeters) 0   American Elm (W/F in millimeters) 6/20   [place] (W/F in millimeters) 4/20   Black [place] (W/F in millimeters) 5/20   Birch Mix (W/F in millimeters) 4/25   [place] (W/F in millimeters) 3/4   [place] (W/F in millimeters) 4/20   Cocklebur (W/F in millimeters) 0   [place] (W/F in millimeters) 5/20   White [place] (W/F in millimeters) 6/20   Careless (W/F in millimeters) 5/20   Nettle (W/F in millimeters) 0   English Plantain (W/F in millimeters) 3/3   Kochia (W/F in millimeters) 3/20   Lamb's Quarter (W/F in millimeters) 3/10   Marshelder (W/F in millimeters) 0   Ragweed Mix* ALK (W/F in millimeters) 3/3   Russian Thistle (W/F in millimeters) 4/20   Sagebrush/Mugwort (W/F in millimeters) 0   Sheep Sorrel (W/F in millimeters) 3/5   Feather Mix* ALK (W/F in millimeters) 0   Penicillium Mix (1:10 w/v) 0   Curvularia spicifera (1:10 w/v) 4/10   Epicoccum (1:10 w/v) 0   Aspergillus fumigatus (1:10 w/v): 0   Alternaria tenius (1:10 w/v) 10/30   H. Cladosporium (1:10 w/v) 0   Phoma herbarum (1:10 w/v) 0      - Stop Claritin. Use Zyrtec, Allegra or Xyzal 1-4 tablets daily as needed for hives. Use as needed for allergic nasal/ocular symptoms.   - Azelastine 2 sprays/nostril twice daily as needed.     Eczema Treatment Instructions     Moisturize the skin: This is the first and most important step.  Thick, greasy ointments work best: Vaseline jelly, Eucerin, Aquaphor, etc.    Apply to entire body at least twice a day, up to  several times per day when the air is dry (as in late fall, winter, early spring)    If using steroid ointments, apply these first; allow to dry before applying moisturizer over the steroid ointment.    Bathing:  Bathe DAILY.    Use as little soap as possible, and very mild soaps.  Wash dirty areas with soap first, rinse off the soap, then fill the tub with clean water.    Soak in lukewarm water for 20-30 minutes    Pat dry gently, and immediately apply moisturizer while the skin is still damp    Steroid ointments:    Hydrocortisone, 1% for rashes on the face and groin area.    Stronger steroid ointment for the rest of the body:Triamcinolone 0.1% cream. Apply twice daily, only to areas of rash (do not use the steroid ointment as a moisturizer).           Avoidance measures: Avoid exposure to things that make eczema worse   Dust mite   Rough or scratchy clothing    Harsh soaps or detergents    AEROALLERGEN AVOIDANCE INSTRUCTIONS  MOLD  Indoors, mold season is year round. Outdoors, most mold prefer seasons with high humidity. Mold prefers damp, dark, warm places. Here are some tips on how to avoid mold exposure.  1.  Keep humidity inside between 35-50% with air conditioning or dehumidifier. The humidity level can be checked with a meter from a hardware store.   2.  Clean surfaces where mold grows and dry wet areas.  3.  Avoid steam cleaning carpets and discard moldy belongings.  4.  Wear a mask when doing yard work and refrain from walking through uncut fields or playing in leaves.  5.  Minimize use of potted plants and do not keep them indoors.  6.  Consider an allergy cover for the pillow and mattress.  POLLEN  Pollens are the tiny airborne particles given off by trees, weeds, and grasses. They can be the cause of seasonal allergic rhinitis or hay fever symptoms, which include stuffy, itchy, runny nose, redness, swelling and itching of the eyes, and itching of the ears and throat. Here are some tips on how to avoid  pollen exposure.  1. .Keep windows closed and use the air conditioner when possible.  2.  Avoid outside exposure in the early morning as pollen counts are highest at that time.  3.  Take a shower and wash hair each night.  4.  Consider wearing a mask when working in the yard and/or garden.  5.  Clean furnace filter monthly with HEPA filters. Consider a HEPA filter vacuum  which will prevent pollen from being reintroduced into the air.   DUST MITES  Dust mites can never be entirely eliminated in the house no matter how clean your house is. Dust mites are attracted to warm, moist areas and feed on dead skin flakes. Here are tips to minimize dust mites in your home.  1.  Encase pillows and mattress/box springs in zippered allergy covers.  2.  Wash bedding in hot water (at least 130 F) every 7-14 days.  3.  Avoid curtains, carpet, and upholstered furniture if possible.  4.  Use HEPA air filters and a HEPA filter vacuum . Change filters monthly. Vacuum weekly.  5.  Keep bedroom simple, avoiding clutter, so it can quickly be dusted.  6.  Cover heating vents with vent filters.  7.  Keep stuffed toys in a closed container and wash or freeze regularly.  8.  Keep clothing in the closet with the door closed.  PETS  Pets present many problems for people with allergies. Dander from pets is very difficult to remove and also is a food source for dust mites.  1.  If possible, find the pet a new home.  2.  If not possible, keep the pet outdoors. Never allow the pet into the bedroom.  3.  Wash pet weekly in warm water.  4.  Encase mattresses, pillows, and box springs in allergen-proof covers.  5.  Use HEPA air filters and a HEPA filter vacuum . Change filters monthly.

## 2021-03-30 NOTE — PROGRESS NOTES
Mercedes Watts is a 21 year old White female with previous medical history significant for hives, allergic rhinitis who returns for a follow up visit.     Patient has a history of atopic dermatitis.  This involves behind her legs, armpits and hands.  Recently she has developed erythematous, raised, pruritic welts on her lower extremities.  This progressed to involve her entire body.  She would have 1 to 3 days and then hives would resolve.  No scarring or discoloration.  She has been hive free for the last 1 week.  Hives started in January.  No clear trigger including new medications, soaps, shampoos, over-the-counter medications.  Heat caused her to have increased hives.  She has had hives off and on throughout her life.  She has rhinorrhea, sneezing, congestion, postnasal drainage, ocular itching and watering in the spring.  Increase symptoms around cats.  Previously on allergy shots.  Shots were beneficial.  Last spring she had chest symptoms and was started on Qvar.  This was helpful.  She is currently off.  She denies any current wheezing, shortness of breath or tightness in chest.      Past Medical History:   Diagnosis Date     Chicken pox      Shingles      Family History   Problem Relation Age of Onset     No Known Problems Mother      No Known Problems Father      No Known Problems Maternal Grandmother      Hypertension Maternal Grandfather      Pulmonary Embolism Paternal Grandmother      No Known Problems Paternal Grandfather      No Known Problems Brother      No Known Problems Sister      No Known Problems Son      No Known Problems Daughter      No Known Problems Maternal Half-Brother      No Known Problems Maternal Half-Sister      No Known Problems Paternal Half-Brother      No Known Problems Paternal Half-Sister      No Known Problems Niece      No Known Problems Nephew      Diabetes Cousin      No Known Problems Other      Past Surgical History:   Procedure Laterality Date     NO HISTORY OF  SURGERY         REVIEW OF SYSTEMS:  Nose: negative for snoring.negative for changes in smell. positive  for drainage.   Eyes: negative for eye watering. negative for eye itching. negative for vision changes. negative for eye redness.  Throat: negative for hoarseness. negative for sore throat. negative for trouble swallowing.   Lungs: negative for shortness of breath.negative for wheezing. negative for sputum production.   Integumentary: positive  for rash. negative for scaling. negative for nail changes.       Current Outpatient Medications:      azelastine (ASTELIN) 0.1 % nasal spray, Spray 2 sprays into both nostrils 2 times daily, Disp: 30 mL, Rfl: 3     loratadine (CLARITIN) 10 MG tablet, Take 10 mg by mouth daily, Disp: , Rfl:      norgestim-eth estrad triphasic (TRI FEMYNOR) 0.18/0.215/0.25 MG-35 MCG tablet, Take 1 tablet daily, Disp: 84 tablet, Rfl: 3     triamcinolone (KENALOG) 0.1 % external cream, Apply topically 2 times daily, Disp: 454 g, Rfl: 1  Immunization History   Administered Date(s) Administered     DTAP (<7y) 02/03/2004     DTaP, Unspecified 07/11/2011     FLU 6-35 months 10/10/2012     HPV Quadrivalent 10/06/2011, 08/30/2012, 09/09/2013     HepA-ped 2 Dose 07/11/2011, 08/30/2012     HepB, Unspecified 1999, 1999, 1999     Influenza (IIV3) PF 11/12/2007, 10/06/2011     Influenza (intradermal) 10/10/2012     Influenza,INJ,MDCK,PF,Quad >4yrs 10/26/2018, 11/22/2020     MMR 02/03/2000, 02/03/2004     Meningococcal (Menactra ) 07/11/2011, 08/15/2018     Pneumococcal, Unspecified 08/22/2000     Polio, Unspecified  1999, 1999, 1999, 02/03/2004     TRIHIBIT (DTAP/HIB, <7y) 1999, 1999, 1999, 08/22/2000     Varicella 02/03/2000     Allergies   Allergen Reactions     Cats Itching     Pollen Extract Hives     Sulfa Drugs Hives         EXAM:   Constitutional:  Appears well-developed and well-nourished. No distress.   HEENT:   Head: Normocephalic.   No  cobblestoning of posterior oropharynx.   Nasal tissue pink and normal appearing.  No rhinorrhea noted.    Eyes: Conjunctivae are non-erythematous   Cardiovascular: Normal rate, regular rhythm and normal heart sounds. Exam reveals no gallop and no friction rub.   No murmur heard.  Respiratory: Effort normal and breath sounds normal. No respiratory distress. No wheezes. No rales.   Musculoskeletal: Normal range of motion.   Neuro: Oriented to person, place, and time.  Skin: Skin is warm and dry. No rash noted.   Psychiatric: Normal mood and affect.     Nursing note and vitals reviewed.    WORKUP:   ENVIRONMENTAL PERCUTANEOUS SKIN TESTING: ADULT  [facility] 3/30/2021   Consent Y   Ordering Physician Dr. Chaidez   Interpreting Physician Dr. Chaidez   Testing Technician al/sg   Location Back   Time start:  8:00 AM   Time End:  8:15 AM   Positive Control: Histatrol*ALK 1 mg/ml 5/20   Negative Control: 50% Glycerin 0   Cat Hair*ALK (10,000 BAU/ml) 6/25   AP Dog Hair/Dander (1:100 w/v) 0   Dust Mite p. 30,000 AU/ml 5/20   Dust Mite f. (30,000 AU/ml) 3/15   Jonnie (W/F in millimeters) 5/25   Andre Grass (100,000 BAU/mL) 5/25   Red [place] (W/F in millimeters) 4/6   Maple/[place] (W/F in millimeters) 7/20   Hackberry (W/F in millimeters) 7/15   [place] (W/F in millimeters) 6/20   [place] *ALK (W/F in millimeters) 0   American Elm (W/F in millimeters) 6/20   Plain (W/F in millimeters) 4/20   Black [place] (W/F in millimeters) 5/20   Birch Mix (W/F in millimeters) 4/25   [place] (W/F in millimeters) 3/4   [place] (W/F in millimeters) 4/20   Cocklebur (W/F in millimeters) 0   [place] (W/F in millimeters) 5/20   White Harmeet (W/F in millimeters) 6/20   Careless (W/F in millimeters) 5/20   Nettle (W/F in millimeters) 0   English Plantain (W/F in millimeters) 3/3   Kochia (W/F in millimeters) 3/20   Lamb's Quarter (W/F in millimeters) 3/10   Marshelder (W/F in millimeters) 0   Ragweed Mix* ALK (W/F in millimeters) 3/3    Russian Thistle (W/F in millimeters) 4/20   Sagebrush/Mugwort (W/F in millimeters) 0   Sheep Sorrel (W/F in millimeters) 3/5   Feather Mix* ALK (W/F in millimeters) 0   Penicillium Mix (1:10 w/v) 0   Curvularia spicifera (1:10 w/v) 4/10   Epicoccum (1:10 w/v) 0   Aspergillus fumigatus (1:10 w/v): 0   Alternaria tenius (1:10 w/v) 10/30   H. Cladosporium (1:10 w/v) 0   Phoma herbarum (1:10 w/v) 0        ASSESSMENT/PLAN:  Problem List Items Addressed This Visit        Respiratory    Allergic rhinitis due to animal hair and dander - Primary    Relevant Medications    azelastine (ASTELIN) 0.1 % nasal spray    Other Relevant Orders    ALLERGY SKIN TESTS,ALLERGENS [50787] (Completed)    Seasonal allergic rhinitis due to pollen     Spring nasal and ocular symptoms.  Increase symptoms around cats.    Skin testing positive for cats, dust mites, grass, trees, weeds, molds.    -Allergen avoidance measures discussed and literature provided.  -Astelin 2 sprays per nostril twice daily as needed.  -Xyzal, Zyrtec, or Allegra 1 to 4 tablets daily as needed.         Relevant Medications    azelastine (ASTELIN) 0.1 % nasal spray    Other Relevant Orders    ALLERGY SKIN TESTS,ALLERGENS [82502] (Completed)    Allergic rhinitis due to dust mite    Relevant Medications    azelastine (ASTELIN) 0.1 % nasal spray    Other Relevant Orders    ALLERGY SKIN TESTS,ALLERGENS [89268] (Completed)       Musculoskeletal and Integumentary    Intrinsic eczema     History of atopic dermatitis.  No recent use of topical corticosteroid.  Allergy testing as noted.    - Eczema treatment instructions were discussed with the patient and literature provided.    - Daily bathing.   - Use of thick emollient such as Eucerin, Aquaphor, Vanicream or Vaseline 2-3 times daily.   - Soak and seal technique was discussed.    - Avoid harsh soaps and detergents. Use fragrance free.    - Triamcinolone 0.1% cream bid to active eczema on body.    - Hydrocortisone 1% cream bid  to active eczema on face.              Relevant Medications    azelastine (ASTELIN) 0.1 % nasal spray    triamcinolone (KENALOG) 0.1 % external cream    Other Relevant Orders    ALLERGY SKIN TESTS,ALLERGENS [[id number]] (Completed)    Hives     Hives that began in January.  No hives over the last week.  Hives would persist anywhere from 1 to 3 days.  Hives are pruritic.  Heat made worse.    -If hives return start either Zyrtec, Allegra or Xyzal 1 to 4 tablets daily.  Previously on Claritin and still having hives.  No clear etiology of symptoms including medications, new soaps, shampoos, detergents, over-the-counter medications.         Relevant Medications    azelastine (ASTELIN) 0.1 % nasal spray    triamcinolone (KENALOG) 0.1 % external cream    Other Relevant Orders    ALLERGY SKIN TESTS,ALLERGENS [[id number]] (Completed)          Chart documentation with Dragon Voice recognition Software. Although reviewed after completion, some words and grammatical errors may remain.    Scot Chaidez DO FAAAAI  Medical Director for Allergy/Immunology at [place], [place]

## 2021-03-30 NOTE — ASSESSMENT & PLAN NOTE
Hives that began in January.  No hives over the last week.  Hives would persist anywhere from 1 to 3 days.  Hives are pruritic.  Heat made worse.    -If hives return start either Zyrtec, Allegra or Xyzal 1 to 4 tablets daily.  Previously on Claritin and still having hives.  No clear etiology of symptoms including medications, new soaps, shampoos, detergents, over-the-counter medications.

## 2021-06-07 NOTE — PATIENT INSTRUCTIONS - HE
Allergy Staff Appt Hours Shot Hours Locations    Physician     Scot Chaidez DO       Support Staff     THIEN Rosales, ADELINE  Tuesday:        [place] 7-4:20     Wednesday:        [place]: 7-5 Thursday:                    [place] 7-6:40     Friday:  [place]  7-2:40   [place]        Thursday: 1-5:50        Friday: 7-10:50     [place]        Tuesday: 7- 3:20        Wednesday: 7-4:20     [place] Monday: 7-4:20        Tuesday: 1-6:20         [facility]  [address] [place] [address]  Appt Line: [phone]  Allergy RN:  [phone]    [facility]  [address] [place] [address]  Appt Line: [phone]  Allergy RN:  [phone]       Important Scheduling Information  Aspirin Desensitization: Appt will last 2 clinic days. Please call the Allergy RN line for your clinic to schedule. Discontinue antihistamines 7 days prior to the appointment.     Food Challenges: Appt will last 3-4 hours. Please call the Allergy RN line for your clinic to schedule. Discontinue antihistamines 7 days prior to the appointment.     Penicillin Testing: Appt will last 2-3 hours. Please call the Allergy RN line for your clinic to schedule. Discontinue antihistamines 7 days prior to the appointment.     Skin Testing: Appt will about 40 minutes. Call the appointment line for your clinic to schedule. Discontinue antihistamines 7 days prior to the appointment.     Venom Testing: Appt will last 2-3 hours. Please call the Allergy RN line for your clinic to schedule. Discontinue antihistamines 7 days prior to the appointment.     Thank you for trusting us with your Allergy, Asthma, and Immunology care. Please feel free to contact us with any questions or concerns you may have.      - QVAR 80mcg 2 puffs inhaled twice daily.   - Albuterol 2 puffs every 4 hours as needed for coughing, wheezing, shortness of breath or chest tightness.   - Claritin daily as needed.   - Return in 4 weeks.        Eczema Treatment Instructions     Moisturize the skin: This is the first and most important step.  Thick, greasy ointments work best: Vaseline jelly, Eucerin, Aquaphor, etc.    Apply to entire body at least twice a day, up to several times per day when the air is dry (as in late fall, winter, early spring)    If using steroid ointments, apply these first; allow to dry before applying moisturizer over the steroid ointment.    Bathing:  Bathe DAILY.    Use as little soap as possible, and very mild soaps.  Wash dirty areas with soap first, rinse off the soap, then fill the tub with clean water.    Soak in lukewarm water for 20-30 minutes    Pat dry gently, and immediately apply moisturizer while the skin is still damp    Steroid ointments:    Stronger steroid ointment for the rest of the body:Triamcinolone 0.1% cream. Apply twice daily, only to areas of rash (do not use the steroid ointment as a moisturizer).         Avoidance measures: Avoid exposure to things that make eczema worse   Dust mite   Rough or scratchy clothing    Harsh soaps or detergents

## 2021-06-07 NOTE — PROGRESS NOTES
"Mercedes Watts is a 20 y.o. female who is being evaluated via a billable telephone visit.      The patient has been notified of following:     \"This telephone visit will be conducted via a call between you and your physician/provider. We have found that certain health care needs can be provided without the need for a physical exam.  This service lets us provide the care you need with a short phone conversation.  If a prescription is necessary, we can send it directly to your pharmacy.  If lab work is needed, we can place an order for that and you can then stop by our lab to have the test done at a later time.     If during the course of the call the physician/provider feels a telephone visit is not appropriate, you will not be charged for this service.\"   Consent has been obtained for this service by 1 care team member: yes.     I have reviewed and updated the patient's Past Medical History, Social History, Family History and Medication List.    The last couple of weeks she has been having shortness of breath and chest tightness intermittently. Symptoms occurring 2-3 times weekly. Symptoms lasted 30-45 minutes. Symptoms possibly worse after walking through the woods. In the past while figure skating outside in cold air she had shortness of breath. Still thinks with cold air she has shortness of breath. No problems with exertion if not cold outside.     Cats can cause shortness of breath and chest tightness. No problems around dogs, perfumes, chemicals, grass or raking leaves.     Last night around 740pm she developed shortness of breath. Treated with primamist. Symptoms improved by 9pm. Symptoms include chest tightness and difficulty taking a full deep breath. She was yawning a lot. No coughing. Wheezing was present. She felt short of breath. She was light headed. She had been outside all day. She got short of breath while walking outside during the day. This worsened over the night. She did not have an inhaler "     She has allergic rhinitis. Was on allergy shots previously. Completed in 2014. Taking Claritin on a daily basis. Asymptomatic currently as from if around cats. Around cats she will develop ocular itching and watering.     She has a history of eczema. Eczema had been well controlled until last year. She moved in with roommate with a dog. Eczema involves forearms, legs and thighs. Eczema is treated with a topical cream hydrocortisone as needed. It is non-helpful. Using Aveeno Eczema daily. No fragrance soap or shampoo. Fragrance in laundry detergent.     Dad with cat.   Paternal grandma with venom allergy and environmental allergies.     No past medical history on file.  No family history on file.  No past surgical history on file.    REVIEW OF SYSTEMS:  General: negative for weight gain. negative for weight loss. negative for changes in sleep.   Ears: negative for fullness. negative for hearing loss. negative for dizziness.   Nose: negative for snoring.negative for changes in smell. negative for drainage.   Eyes: Positive for eye watering. Porsitive for eye itching. negative for vision changes. negative for eye redness.  Throat: negative for hoarseness. negative for sore throat. negative for trouble swallowing.   Lungs: Positive for shortness of breath. Positive for wheezing. negative for sputum production.   Cardiovascular: negative for chest pain. negative for swelling of ankles. negative for fast or irregular heartbeat.   Gastrointestinal: negative for nausea. negative  for heartburn. negative  for acid reflux.   Musculoskeletal: negative  for joint pain. negative  for joint stiffness. negative  for joint swelling.   Neurologic: negative  for seizures. negative  for fainting. negative  for weakness.   Psychiatric: negative  for changes in mood. negative  for anxiety.   Endocrine: negative  for cold intolerance. negative  for heat intolerance. negative  for tremors.   Lymphatic: negative  for lower extremity  swelling. negative  for lymph node swelling.   Hematologic: negative  for easy bruising. negative  for easy bleeding.  Integumentary: negative  for rash. negative  for scaling. negative  for nail changes.       Current Outpatient Medications:      loratadine (CLARITIN) 10 mg tablet, Take 10 mg by mouth., Disp: , Rfl:      norgestimate-ethinyl estradioL (ORTHO TRI-CYCLEN) 0.18/0.215/0.25 mg-35 mcg (28) tablet, Take 1 tablet by mouth., Disp: , Rfl:      albuterol (PROAIR HFA;PROVENTIL HFA;VENTOLIN HFA) 90 mcg/actuation inhaler, Inhale 1 puff every 4 (four) hours as needed for wheezing or shortness of breath., Disp: 1 Inhaler, Rfl: 0     beclomethasone (QVAR) 80 mcg/actuation inhaler, Inhale 2 puffs 2 (two) times a day., Disp: 1 Inhaler, Rfl: 12     triamcinolone (KENALOG) 0.1 % ointment, Apply topically 2 (two) times a day., Disp: 30 g, Rfl: 0    There is no immunization history on file for this patient.  Allergies   Allergen Reactions     Sulfa (Sulfonamide Antibiotics) Hives         ASSESSMENT/PLAN:  Problem List Items Addressed This Visit        Allergy/Immunology Problems    Intrinsic eczema     History of eczema. Worse over the last 1 year. Possibly associated with living with a dog. Using hydrocortisone cream. Using Aveeno eczema as needed. Fragrance in detergent. Otherwise fragrance free.     - Eczema treatment instructions were discussed with the patient and literature provided.    - Daily bathing.   - Use of thick emollient such as Eucerin, Aquaphor, Vanicream or Vaseline 2-3 times daily.   - Soak and seal technique was discussed.    - Avoid harsh soaps and detergents. Use fragrance free.    - Triamcinolone 0.1% cream bid to active eczema on body.            Relevant Medications    triamcinolone (KENALOG) 0.1 % ointment    Seasonal allergic rhinitis due to pollen     Was on allergy shots. Stopped in 2014. Allergic to pollen and cats. Symptoms well controlled. Using Claritin daily. Asymptomatic.     - Claritin  daily as needed.          Relevant Medications    loratadine (CLARITIN) 10 mg tablet    beclomethasone (QVAR) 80 mcg/actuation inhaler    albuterol (PROAIR HFA;PROVENTIL HFA;VENTOLIN HFA) 90 mcg/actuation inhaler    Allergic rhinitis due to animal (cat) (dog) hair and dander    Relevant Medications    loratadine (CLARITIN) 10 mg tablet    beclomethasone (QVAR) 80 mcg/actuation inhaler    albuterol (PROAIR HFA;PROVENTIL HFA;VENTOLIN HFA) 90 mcg/actuation inhaler       Other    Shortness of breath - Primary     Recent shortness of breath, wheezing and chest tightness. Symptoms greater than 3 times weekly. Symptoms around cats. Atopic. No prior asthma history. Cold air makes worse with exertion.     - Trial of QVAR 80mcg 2 puffs twice daily.   - Albuterol 2 puffs every 4 hours as needed for chest tightness, wheezing, coughing or shortness of breath.          Relevant Medications    beclomethasone (QVAR) 80 mcg/actuation inhaler    albuterol (PROAIR HFA;PROVENTIL HFA;VENTOLIN HFA) 90 mcg/actuation inhaler        Return in 4 weeks.     Chart documentation with Dragon Voice recognition Software. Although reviewed after completion, some words and grammatical errors may remain.    I have reviewed the note as documented above.  This accurately captures the substance of my conversation with the patient.    Phone call contact time  Call Started at 1401  Call Ended at 1426    Scot Chaidez DO FAAAAI  Allergy/Immunology  [place], [place]

## 2021-07-16 ENCOUNTER — MYC MEDICAL ADVICE (OUTPATIENT)
Dept: FAMILY MEDICINE | Facility: OTHER | Age: 21
End: 2021-07-16

## 2021-07-16 DIAGNOSIS — N94.6 DYSMENORRHEA: Primary | ICD-10-CM

## 2021-07-19 RX ORDER — NORGESTIMATE AND ETHINYL ESTRADIOL 0.25-0.035
1 KIT ORAL DAILY
Qty: 84 TABLET | Refills: 0 | Status: SHIPPED | OUTPATIENT
Start: 2021-07-19 | End: 2021-07-20

## 2021-07-20 ENCOUNTER — OFFICE VISIT (OUTPATIENT)
Dept: MIDWIFE SERVICES | Facility: CLINIC | Age: 21
End: 2021-07-20
Payer: COMMERCIAL

## 2021-07-20 VITALS
DIASTOLIC BLOOD PRESSURE: 89 MMHG | HEART RATE: 111 BPM | HEIGHT: 62 IN | WEIGHT: 135.7 LBS | BODY MASS INDEX: 24.97 KG/M2 | SYSTOLIC BLOOD PRESSURE: 159 MMHG

## 2021-07-20 DIAGNOSIS — N94.6 DYSMENORRHEA: Primary | ICD-10-CM

## 2021-07-20 DIAGNOSIS — Z30.011 ENCOUNTER FOR INITIAL PRESCRIPTION OF CONTRACEPTIVE PILLS: ICD-10-CM

## 2021-07-20 PROCEDURE — 99203 OFFICE O/P NEW LOW 30 MIN: CPT | Performed by: ADVANCED PRACTICE MIDWIFE

## 2021-07-20 RX ORDER — CETIRIZINE HYDROCHLORIDE 10 MG/1
10 TABLET ORAL DAILY
COMMUNITY

## 2021-07-20 RX ORDER — LEVONORGESTREL AND ETHINYL ESTRADIOL 0.15-0.03
1 KIT ORAL DAILY
Qty: 91 TABLET | Refills: 3 | Status: SHIPPED | OUTPATIENT
Start: 2021-07-20 | End: 2022-01-20

## 2021-07-20 ASSESSMENT — MIFFLIN-ST. JEOR: SCORE: 1320.84

## 2021-07-20 NOTE — PROGRESS NOTES
"Mercedes Watts presents to the clinic complaining of very painful and long periods with nausea and diarrhea. She has been taking Ibuprofen during her periods but without adequate relief. She is currently taking a triphasic OCP and gets a period every month. She reports many women in her family have had hysterectomies presumably for menorrhagia.     O: BP (!) 159/89 (BP Location: Left arm, Patient Position: Sitting, Cuff Size: Adult Regular)   Pulse 111   Ht 1.562 m (5' 1.5\")   Wt 61.6 kg (135 lb 11.2 oz)   BMI 25.23 kg/m    Mentation: normal and bright  Heart:RRR  Lungs: Clear    Pelvic exam: Defferred r/t recent pap and sti testing with normal results.    A: (N94.6) Dysmenorrhea  (primary encounter diagnosis)  Comment:   Plan: US Transvaginal Non OB, levonorgestrel-ethinyl         estradiol (SEASONALE) 0.15-0.03 MG tablet            (Z30.011) Encounter for initial prescription of contraceptive pills  Comment:   Plan: levonorgestrel-ethinyl estradiol (SEASONALE)         0.15-0.03 MG tablet            Plan:  Recommend pelvic ultrasound to r/o pathology. ALso recommend swithing to extended cycle OCP or Mirena IUD. HO about IUD given to consider and call her insurance company.     Wanda Martino, GREGM, APRN CNM        "

## 2021-08-06 ENCOUNTER — OFFICE VISIT (OUTPATIENT)
Dept: MIDWIFE SERVICES | Facility: CLINIC | Age: 21
End: 2021-08-06
Attending: ADVANCED PRACTICE MIDWIFE
Payer: COMMERCIAL

## 2021-08-06 ENCOUNTER — ANCILLARY PROCEDURE (OUTPATIENT)
Dept: ULTRASOUND IMAGING | Facility: CLINIC | Age: 21
End: 2021-08-06
Attending: ADVANCED PRACTICE MIDWIFE
Payer: COMMERCIAL

## 2021-08-06 VITALS
SYSTOLIC BLOOD PRESSURE: 130 MMHG | TEMPERATURE: 98 F | BODY MASS INDEX: 25.28 KG/M2 | DIASTOLIC BLOOD PRESSURE: 72 MMHG | WEIGHT: 136 LBS

## 2021-08-06 DIAGNOSIS — N94.6 DYSMENORRHEA: Primary | ICD-10-CM

## 2021-08-06 DIAGNOSIS — N94.6 DYSMENORRHEA: ICD-10-CM

## 2021-08-06 PROCEDURE — 99213 OFFICE O/P EST LOW 20 MIN: CPT | Performed by: ADVANCED PRACTICE MIDWIFE

## 2021-08-06 PROCEDURE — 76830 TRANSVAGINAL US NON-OB: CPT | Performed by: OBSTETRICS & GYNECOLOGY

## 2021-08-06 NOTE — PROGRESS NOTES
S:  Mercedes Watts is 21 year old  who presents today for follow up ultrasound. She was seen  for heavy and painful menses. Prior to using birth control she had irregular menses, every 2-3 months. She was started on a triphasic OCP and had monthly periods. They were okay but starting in 2021 she was having very painful menses. She was taking 800 mg of ibuprofen every 4 hours and not getting relief. Just before being seen in July she had a long and painful menses. The pain started about 4 days prior to the bleeding and then the bleeding lasted for about 10 days. She stopped her birth control after that menses to see if that helped with her bleeding and pain. She stopped after the full pack. At her visit she was started on Seasonale to try and eliminate her menses more to help with the pain. She is currently at about 2.5 weeks of using the pills. She has not had bleeding since starting. She started right after her period ended. She is also interested in an IUD.   Ultrasound today showed echogenic area in lower uterine segment with blood flow on the doppler as well as bilateral ovarian cysts and multiple cysts around periphery. Discussed results with Gaby. She is happy to know what is going on. She has heard the diagnosis of PCOS before. We discussed diagnosis confirmed today with her history of irregular menses and cysts on ovaries. We discussed increased risk for diabetes and potential for challenges with infertility. At this time in her life she does not see herself having kids. No other questions or concerns at this time.     O:  /72   Temp 98  F (36.7  C) (Oral)   Wt 61.7 kg (136 lb)   BMI 25.28 kg/m    Patient is well     A:  Dysmenorrhea     P:  (N94.6) Dysmenorrhea  (primary encounter diagnosis)  Comment: Discussed with patient if seasonale works well for her and she is okay with taking pills, I recommend she stay with that form of birth control. If she gets break through bleeding  or if her next few menses are still very uncomfortable we discussed switching to Dee Dee. She is also interested in an IUD. We discussed cysts can sometimes get worse with the Mirena IUD but she could consider that or the Kyleena to try if she feels like pills will not work for her. She agrees with this plan of care. We discussed we will wait for the ultrasound to get signed today to follow up on further plans, we discussed I am unsure what the echogenic area means on the preliminary results. Will call or mychart with results. We also discussed that with her strong family history of painful cramps and heavy bleeding she could also consider a hematology consult to see if there is anything genetic causing the heavy bleeding. We also discussed they sometimes use TXA pills to help with that. If she continues to have concerns about her bleeding and pain we can refer at that time.     Follow up PRN.   KADE Bernardo CNM

## 2021-09-09 ENCOUNTER — OFFICE VISIT (OUTPATIENT)
Dept: FAMILY MEDICINE | Facility: CLINIC | Age: 21
End: 2021-09-09
Payer: COMMERCIAL

## 2021-09-09 ENCOUNTER — LAB (OUTPATIENT)
Dept: LAB | Facility: CLINIC | Age: 21
End: 2021-09-09

## 2021-09-09 VITALS
OXYGEN SATURATION: 100 % | WEIGHT: 138 LBS | DIASTOLIC BLOOD PRESSURE: 66 MMHG | SYSTOLIC BLOOD PRESSURE: 120 MMHG | TEMPERATURE: 98.4 F | BODY MASS INDEX: 25.65 KG/M2 | HEART RATE: 98 BPM

## 2021-09-09 DIAGNOSIS — N92.1 MENORRHAGIA WITH IRREGULAR CYCLE: ICD-10-CM

## 2021-09-09 DIAGNOSIS — N92.1 MENORRHAGIA WITH IRREGULAR CYCLE: Primary | ICD-10-CM

## 2021-09-09 DIAGNOSIS — E28.2 PCOS (POLYCYSTIC OVARIAN SYNDROME): ICD-10-CM

## 2021-09-09 LAB
BASOPHILS # BLD AUTO: 0.1 10E3/UL (ref 0–0.2)
BASOPHILS NFR BLD AUTO: 1 %
EOSINOPHIL # BLD AUTO: 0.3 10E3/UL (ref 0–0.7)
EOSINOPHIL NFR BLD AUTO: 6 %
ERYTHROCYTE [DISTWIDTH] IN BLOOD BY AUTOMATED COUNT: 14.9 % (ref 10–15)
HBA1C MFR BLD: 5.2 % (ref 0–5.6)
HCT VFR BLD AUTO: 38.6 % (ref 35–47)
HGB BLD-MCNC: 12.1 G/DL (ref 11.7–15.7)
INSULIN SERPL-ACNC: 15.8 MU/L (ref 3–25)
IRON SATN MFR SERPL: 6 % (ref 15–46)
IRON SERPL-MCNC: 35 UG/DL (ref 35–180)
LYMPHOCYTES # BLD AUTO: 1.4 10E3/UL (ref 0.8–5.3)
LYMPHOCYTES NFR BLD AUTO: 28 %
MCH RBC QN AUTO: 24.9 PG (ref 26.5–33)
MCHC RBC AUTO-ENTMCNC: 31.3 G/DL (ref 31.5–36.5)
MCV RBC AUTO: 79 FL (ref 78–100)
MONOCYTES # BLD AUTO: 0.3 10E3/UL (ref 0–1.3)
MONOCYTES NFR BLD AUTO: 7 %
NEUTROPHILS # BLD AUTO: 2.9 10E3/UL (ref 1.6–8.3)
NEUTROPHILS NFR BLD AUTO: 59 %
PLATELET # BLD AUTO: 286 10E3/UL (ref 150–450)
RBC # BLD AUTO: 4.86 10E6/UL (ref 3.8–5.2)
TIBC SERPL-MCNC: 580 UG/DL (ref 240–430)
TSH SERPL DL<=0.005 MIU/L-ACNC: 1.94 MU/L (ref 0.4–4)
WBC # BLD AUTO: 5 10E3/UL (ref 4–11)

## 2021-09-09 PROCEDURE — 83550 IRON BINDING TEST: CPT

## 2021-09-09 PROCEDURE — 85025 COMPLETE CBC W/AUTO DIFF WBC: CPT

## 2021-09-09 PROCEDURE — 36415 COLL VENOUS BLD VENIPUNCTURE: CPT

## 2021-09-09 PROCEDURE — 83525 ASSAY OF INSULIN: CPT

## 2021-09-09 PROCEDURE — 83036 HEMOGLOBIN GLYCOSYLATED A1C: CPT

## 2021-09-09 PROCEDURE — 99214 OFFICE O/P EST MOD 30 MIN: CPT | Performed by: PHYSICIAN ASSISTANT

## 2021-09-09 PROCEDURE — 84443 ASSAY THYROID STIM HORMONE: CPT

## 2021-09-09 NOTE — PATIENT INSTRUCTIONS
Schedule with OB-GYN for surgery consultation to remove polyp  Get labs done  Vitex/chasteberry 40 mg daily for 3 months  Return to clinic for any new or worsening symptoms or go to ER Urgent care in off hours    [facility] [place]

## 2021-09-09 NOTE — PROGRESS NOTES
"    Assessment & Plan       ICD-10-CM    1. Menorrhagia with irregular cycle  N92.1 TSH with free T4 reflex     CBC with platelets and differential     Iron and iron binding capacity   2. PCOS (polycystic ovarian syndrome)  E28.2 Insulin level     Hemoglobin A1c     Doesn't tolerate iron well, will recommend ferrous glycinate with vitamin C 200 mg and metamucil daily if low iron. See below. Return to clinic for any new or worsening symptoms or go to ER Urgent care in off hours                   BMI:   Estimated body mass index is 25.65 kg/m  as calculated from the following:    Height as of 21: 1.562 m (5' 1.5\").    Weight as of this encounter: 62.6 kg (138 lb).       Patient Instructions   Schedule with OB-GYN for surgery consultation to remove polyp  Get labs done  Vitex/chasteberry 40 mg daily for 3 months  Return to clinic for any new or worsening symptoms or go to ER Urgent care in off hours    [facility] in [place]          No follow-ups on file.    [facility]    Yuki Griffin is a 21 year old who presents for the following health issues     HPI       Mercedes Watts is 21 year old  who presents today for follow up ultrasound. She was seen  for heavy and painful menses. Prior to using birth control she had irregular menses, every 2-3 months. She was started on a triphasic OCP and had monthly periods. They were okay but starting in 2021 she was having very painful menses. She was taking 800 mg of ibuprofen every 4 hours and not getting relief. Just before being seen in July she had a long and painful menses. The pain started about 4 days prior to the bleeding and then the bleeding lasted for about 10 days. She stopped her birth control after that menses to see if that helped with her bleeding and pain. She stopped after the full pack. At her visit she was started on Seasonale to try and eliminate her menses more to help " with the pain. She is currently at about 2.5 weeks of using the pills (monophasic). She has not had bleeding since starting. She started right after her period ended. She is also interested in an IUD.   Ultrasound today showed echogenic area in lower uterine segment with blood flow on the doppler as well as bilateral ovarian cysts and multiple cysts around periphery. Discussed results with Gaby. She is happy to know what is going on. She has heard the diagnosis of PCOS before. We discussed diagnosis confirmed today with her history of irregular menses and cysts on ovaries. We discussed increased risk for diabetes and potential for challenges with infertility. At this time in her life she does not see herself having kids. No other questions or concerns at this time.         Initially first day of her periods were really heavy  By the time she was 17, she was bleeding through overnight pads  Went on birth control at 19 because she was bleeding through the tampon and the pad.   Mom had very heavy periods as well  She does not want to have kids, no internal drive to have one    She knows how to do stress management  Does well with relaxing.     Reports having some breakthrough pain in the right low back which is what she experiences when she has bad cramping. Has been happening fairly consistently lately but hasn't finished her first month on the new birth control.       Review of Systems   CONSTITUTIONAL: NEGATIVE for fever, chills, change in weight  ENT/MOUTH: NEGATIVE for ear, mouth and throat problems  RESP: NEGATIVE for significant cough or SOB  CV: NEGATIVE for chest pain, palpitations or peripheral edema      Objective    /66   Pulse 98   Temp 98.4  F (36.9  C) (Temporal)   Wt 62.6 kg (138 lb)   SpO2 100%   BMI 25.65 kg/m    Body mass index is 25.65 kg/m .  Physical Exam   GENERAL: healthy, alert and no distress  NECK: no adenopathy, no asymmetry, masses, or scars and thyroid normal to  palpation  RESP: lungs clear to auscultation - no rales, rhonchi or wheezes  CV: regular rate and rhythm, normal S1 S2, no S3 or S4, no murmur, click or rub, no peripheral edema and peripheral pulses strong  ABDOMEN: soft, nontender, no hepatosplenomegaly, no masses and bowel sounds normal  MS: no gross musculoskeletal defects noted, no edema    Office Visit on 02/09/2021   Component Date Value Ref Range Status     PAP 02/09/2021 NIL   Final     Copath Report 02/09/2021    Final                    Value:  Patient Name: DARLING COTA  MR#: [id number]  Specimen #: [id number]  Collected: 2/9/2021  Received: 2/10/2021  Reported: 2/11/2021 10:47  Ordering Phy(s): ROBERTO JANG    For improved result formatting, select 'View Enhanced Report Format' under   Linked Documents section.    SPECIMEN/STAIN PROCESS:  Pap imaged thin layer prep screening (Surepath, FocalPoint with guided   screening)       Pap-Cyto x 1    SOURCE: Cervical, endocervical  ----------------------------------------------------------------   Pap imaged thin layer prep screening (Surepath, FocalPoint with guided   screening)  SPECIMEN ADEQUACY:  Satisfactory for evaluation.  -Transformation zone component present.    CYTOLOGIC INTERPRETATION:    Negative for intraepithelial lesion or malignancy    Electronically signed out by:  CHER Gonsales (ASCP)    CLINICAL HISTORY:    Irregular periods  Irregular Bleeding,    Papanicolaou Test Limitations:  Cervical cytology is a screening test with   limited sensitivity; regular  s                          creening is critical for cancer prevention; Pap tests are primarily   effective for the diagnosis/prevention of  squamous cell carcinoma, not adenocarcinomas or other cancers.    COLLECTION SITE:  Client:  [facility]  [facility]    The technical component of this testing was completed at the [facility], with the professional  component performed   at the [facility], [address] [place] [address],   [place] [address] ([phone])         Specimen Description 02/09/2021 Vagina   Final     Wet Prep 02/09/2021 *  Final                    Value:Few  Yeast seen       Wet Prep 02/09/2021    Final                    Value:Few  WBC'S seen       Wet Prep 02/09/2021 No Trichomonas seen   Final     Wet Prep 02/09/2021 No clue cells seen   Final     Specimen Descrip 02/09/2021 Cervix   Final     N Gonorrhea PCR 02/09/2021 Negative  NEG^Negative Final    Comment: Negative for N. gonorrhoeae rRNA by transcription mediated amplification.  A negative result by transcription mediated amplification does not preclude   the presence of N. gonorrhoeae infection because results are dependent on   proper and adequate collection, absence of inhibitors, and sufficient rRNA to   be detected.       Specimen Description 02/09/2021 Cervix   Final     Chlamydia Trachomatis PCR 02/09/2021 Negative  NEG^Negative Final    Comment: Negative for C. trachomatis rRNA by transcription mediated amplification.  A negative result by transcription mediated amplification does not preclude   the presence of C. trachomatis infection because results are dependent on   proper and adequate collection, absence of inhibitors, and sufficient rRNA to   be detected.

## 2021-09-14 ENCOUNTER — OFFICE VISIT (OUTPATIENT)
Dept: OBGYN | Facility: CLINIC | Age: 21
End: 2021-09-14
Payer: COMMERCIAL

## 2021-09-14 VITALS
WEIGHT: 138 LBS | HEIGHT: 62 IN | DIASTOLIC BLOOD PRESSURE: 60 MMHG | BODY MASS INDEX: 25.4 KG/M2 | SYSTOLIC BLOOD PRESSURE: 124 MMHG

## 2021-09-14 DIAGNOSIS — Z23 NEED FOR TDAP VACCINATION: ICD-10-CM

## 2021-09-14 DIAGNOSIS — N94.6 DYSMENORRHEA: Primary | ICD-10-CM

## 2021-09-14 DIAGNOSIS — B37.31 YEAST INFECTION OF THE VAGINA: ICD-10-CM

## 2021-09-14 PROCEDURE — 90715 TDAP VACCINE 7 YRS/> IM: CPT | Performed by: OBSTETRICS & GYNECOLOGY

## 2021-09-14 PROCEDURE — 99213 OFFICE O/P EST LOW 20 MIN: CPT | Mod: 25 | Performed by: OBSTETRICS & GYNECOLOGY

## 2021-09-14 PROCEDURE — 90471 IMMUNIZATION ADMIN: CPT | Performed by: OBSTETRICS & GYNECOLOGY

## 2021-09-14 RX ORDER — FLUCONAZOLE 150 MG/1
150 TABLET ORAL DAILY
Qty: 3 TABLET | Refills: 0 | Status: SHIPPED | OUTPATIENT
Start: 2021-09-14 | End: 2021-09-17

## 2021-09-14 ASSESSMENT — MIFFLIN-ST. JEOR: SCORE: 1331.27

## 2021-09-14 NOTE — NURSING NOTE
Clinic Administered Medication Documentation          Injectable Medication Documentation    Patient was given tdap. Prior to medication administration, verified patients identity using patient s name and date of birth. Please see MAR and medication order for additional information. Patient instructed to remain in clinic for 15 minutes.      Was entire vial of medication used? Yes  Vial/Syringe: Single dose vial  Expiration Date:  3/18/23  Was this medication supplied by the patient? No

## 2021-09-16 NOTE — PROGRESS NOTES
"CC:  Follow up ultrasound/polyp  HPI:  Mercedes Watts is a 21 year old female No LMP recorded.  Has PCOS and cycles are usually about every 3 months. Has had heavy menses her entire life and started 14-14 y/o. By age 16 would use super ++ tampon and pad and soak through on heavy days. Also would have stabbing pain with cycles and be doubled over.     Was started on a triphasic OCP and was having monthly menses, but janary became more painful. Changed to a monophasic OCP recently and doing first pill pack.    Had u/s done 8/6 and still images are reviewed with the patient at today's visit. Uterus normal size, ovaries with PCOS appearance. Tiny 7 x 3 mm area in FERDINAND.       Allergies: Cats, Pollen extract, and Sulfa drugs    The patient's past medical history, social history and family history is reviewed at our visit and updated in EPIC.    EXAM:  Blood pressure 124/60, height 1.562 m (5' 1.5\"), weight 62.6 kg (138 lb), not currently breastfeeding.  BMI= Body mass index is 25.65 kg/m .  No LMP recorded.  General - pleasant female in no acute distress.  Neurological - alert and oriented X 3  Psychiatric - normal mood and affect    prep time day of service 2 min  visit time with the patient 13 min  post visit work including documentation time 5 min  Total time: 20 min      ASSESSMENT/PLAN:  (N94.6) Dysmenorrhea  (primary encounter diagnosis)  Comment: on OCP  Plan: discussed small ?polyp but so tiny and not having sx of polyp no need for surgery. She was happy to hear this. Discussed PCOS and OCP good option, continuous therapy discussed and questions answered.     (B37.3) Yeast infection of the vagina  (primary encounter diagnosis)  Plan: fluconazole (DIFLUCAN) 150 MG tablet        Refill was done.    (Z23) Need for Tdap vaccination  Plan: VACCINE ADMINISTRATION [facility], INITIAL, TDAP         VACCINE (Adacel, Boostrix)  [[id number]]        Given today      Melissa Cesar MD    "

## 2021-09-25 ENCOUNTER — HEALTH MAINTENANCE LETTER (OUTPATIENT)
Age: 21
End: 2021-09-25

## 2021-09-25 ENCOUNTER — HOSPITAL ENCOUNTER (EMERGENCY)
Facility: CLINIC | Age: 21
Discharge: HOME | End: 2021-09-25
Attending: EMERGENCY MEDICINE | Admitting: EMERGENCY MEDICINE
Payer: COMMERCIAL

## 2021-09-25 VITALS
RESPIRATION RATE: 16 BRPM | WEIGHT: 134.8 LBS | BODY MASS INDEX: 25.06 KG/M2 | OXYGEN SATURATION: 100 % | DIASTOLIC BLOOD PRESSURE: 61 MMHG | TEMPERATURE: 98.1 F | HEART RATE: 79 BPM | SYSTOLIC BLOOD PRESSURE: 115 MMHG

## 2021-09-25 DIAGNOSIS — R10.2 PELVIC PAIN IN FEMALE: ICD-10-CM

## 2021-09-25 LAB
ALBUMIN SERPL-MCNC: 3.8 G/DL (ref 3.4–5)
ALBUMIN UR-MCNC: 70 MG/DL
ALBUMIN UR-MCNC: NEGATIVE MG/DL
ALP SERPL-CCNC: 40 U/L (ref 40–150)
ALT SERPL W P-5'-P-CCNC: 14 U/L (ref 0–50)
ANION GAP SERPL CALCULATED.3IONS-SCNC: 3 MMOL/L (ref 3–14)
APPEARANCE UR: ABNORMAL
APPEARANCE UR: CLEAR
AST SERPL W P-5'-P-CCNC: 11 U/L (ref 0–45)
BACTERIA #/AREA URNS HPF: ABNORMAL /HPF
BASOPHILS # BLD AUTO: 0.1 10E3/UL (ref 0–0.2)
BASOPHILS NFR BLD AUTO: 1 %
BILIRUB SERPL-MCNC: 0.4 MG/DL (ref 0.2–1.3)
BILIRUB UR QL STRIP: NEGATIVE
BILIRUB UR QL STRIP: NEGATIVE
BUN SERPL-MCNC: 8 MG/DL (ref 7–30)
CALCIUM SERPL-MCNC: 8.7 MG/DL (ref 8.5–10.1)
CHLORIDE BLD-SCNC: 112 MMOL/L (ref 94–109)
CO2 SERPL-SCNC: 24 MMOL/L (ref 20–32)
COLOR UR AUTO: ABNORMAL
COLOR UR AUTO: YELLOW
CREAT SERPL-MCNC: 1 MG/DL (ref 0.52–1.04)
EOSINOPHIL # BLD AUTO: 0.5 10E3/UL (ref 0–0.7)
EOSINOPHIL NFR BLD AUTO: 6 %
ERYTHROCYTE [DISTWIDTH] IN BLOOD BY AUTOMATED COUNT: 15.7 % (ref 10–15)
GFR SERPL CREATININE-BSD FRML MDRD: 80 ML/MIN/1.73M2
GLUCOSE BLD-MCNC: 123 MG/DL (ref 70–99)
GLUCOSE UR STRIP-MCNC: NEGATIVE MG/DL
GLUCOSE UR STRIP-MCNC: NEGATIVE MG/DL
HCT VFR BLD AUTO: 41.2 % (ref 35–47)
HGB BLD-MCNC: 12.2 G/DL (ref 11.7–15.7)
HGB UR QL STRIP: ABNORMAL
HGB UR QL STRIP: NEGATIVE
IMM GRANULOCYTES # BLD: 0.1 10E3/UL
IMM GRANULOCYTES NFR BLD: 1 %
KETONES UR STRIP-MCNC: 40 MG/DL
KETONES UR STRIP-MCNC: ABNORMAL MG/DL
LEUKOCYTE ESTERASE UR QL STRIP: ABNORMAL
LEUKOCYTE ESTERASE UR QL STRIP: ABNORMAL
LYMPHOCYTES # BLD AUTO: 2.2 10E3/UL (ref 0.8–5.3)
LYMPHOCYTES NFR BLD AUTO: 30 %
MCH RBC QN AUTO: 24.2 PG (ref 26.5–33)
MCHC RBC AUTO-ENTMCNC: 29.6 G/DL (ref 31.5–36.5)
MCV RBC AUTO: 82 FL (ref 78–100)
MONOCYTES # BLD AUTO: 0.4 10E3/UL (ref 0–1.3)
MONOCYTES NFR BLD AUTO: 5 %
MUCOUS THREADS #/AREA URNS LPF: PRESENT /LPF
MUCOUS THREADS #/AREA URNS LPF: PRESENT /LPF
NEUTROPHILS # BLD AUTO: 4.4 10E3/UL (ref 1.6–8.3)
NEUTROPHILS NFR BLD AUTO: 57 %
NITRATE UR QL: NEGATIVE
NITRATE UR QL: NEGATIVE
NRBC # BLD AUTO: 0 10E3/UL
NRBC BLD AUTO-RTO: 0 /100
PH UR STRIP: 6 [PH] (ref 5–7)
PH UR STRIP: 8 [PH] (ref 5–7)
PLATELET # BLD AUTO: 300 10E3/UL (ref 150–450)
POTASSIUM BLD-SCNC: 4.4 MMOL/L (ref 3.4–5.3)
PROT SERPL-MCNC: 7.3 G/DL (ref 6.8–8.8)
RBC # BLD AUTO: 5.05 10E6/UL (ref 3.8–5.2)
RBC URINE: 1 /HPF
RBC URINE: >182 /HPF
SODIUM SERPL-SCNC: 139 MMOL/L (ref 133–144)
SP GR UR STRIP: 1.02 (ref 1–1.03)
SP GR UR STRIP: 1.03 (ref 1–1.03)
SQUAMOUS EPITHELIAL: 4 /HPF
SQUAMOUS EPITHELIAL: 56 /HPF
TRANSITIONAL EPI: <1 /HPF
UROBILINOGEN UR STRIP-MCNC: 2 MG/DL
UROBILINOGEN UR STRIP-MCNC: NORMAL MG/DL
WBC # BLD AUTO: 7.6 10E3/UL (ref 4–11)
WBC URINE: 3 /HPF
WBC URINE: 33 /HPF

## 2021-09-25 PROCEDURE — 258N000003 HC RX IP 258 OP 636: Performed by: EMERGENCY MEDICINE

## 2021-09-25 PROCEDURE — 250N000011 HC RX IP 250 OP 636: Performed by: EMERGENCY MEDICINE

## 2021-09-25 PROCEDURE — 99284 EMERGENCY DEPT VISIT MOD MDM: CPT | Performed by: EMERGENCY MEDICINE

## 2021-09-25 PROCEDURE — 81001 URINALYSIS AUTO W/SCOPE: CPT | Mod: 91 | Performed by: EMERGENCY MEDICINE

## 2021-09-25 PROCEDURE — 96361 HYDRATE IV INFUSION ADD-ON: CPT | Performed by: EMERGENCY MEDICINE

## 2021-09-25 PROCEDURE — 80053 COMPREHEN METABOLIC PANEL: CPT | Performed by: EMERGENCY MEDICINE

## 2021-09-25 PROCEDURE — 81001 URINALYSIS AUTO W/SCOPE: CPT | Performed by: EMERGENCY MEDICINE

## 2021-09-25 PROCEDURE — 85025 COMPLETE CBC W/AUTO DIFF WBC: CPT | Performed by: EMERGENCY MEDICINE

## 2021-09-25 PROCEDURE — 87086 URINE CULTURE/COLONY COUNT: CPT | Performed by: EMERGENCY MEDICINE

## 2021-09-25 PROCEDURE — 36415 COLL VENOUS BLD VENIPUNCTURE: CPT | Performed by: EMERGENCY MEDICINE

## 2021-09-25 PROCEDURE — 96374 THER/PROPH/DIAG INJ IV PUSH: CPT | Performed by: EMERGENCY MEDICINE

## 2021-09-25 PROCEDURE — 96375 TX/PRO/DX INJ NEW DRUG ADDON: CPT | Performed by: EMERGENCY MEDICINE

## 2021-09-25 PROCEDURE — 99284 EMERGENCY DEPT VISIT MOD MDM: CPT | Mod: 25 | Performed by: EMERGENCY MEDICINE

## 2021-09-25 RX ORDER — ONDANSETRON 2 MG/ML
4 INJECTION INTRAMUSCULAR; INTRAVENOUS ONCE
Status: COMPLETED | OUTPATIENT
Start: 2021-09-25 | End: 2021-09-25

## 2021-09-25 RX ORDER — HYDROMORPHONE HYDROCHLORIDE 1 MG/ML
0.5 INJECTION, SOLUTION INTRAMUSCULAR; INTRAVENOUS; SUBCUTANEOUS
Status: DISCONTINUED | OUTPATIENT
Start: 2021-09-25 | End: 2021-09-25

## 2021-09-25 RX ORDER — ONDANSETRON 4 MG/1
4 TABLET, ORALLY DISINTEGRATING ORAL EVERY 8 HOURS PRN
Qty: 10 TABLET | Refills: 0 | Status: SHIPPED | OUTPATIENT
Start: 2021-09-25 | End: 2021-09-28

## 2021-09-25 RX ORDER — KETOROLAC TROMETHAMINE 30 MG/ML
30 INJECTION, SOLUTION INTRAMUSCULAR; INTRAVENOUS ONCE
Status: COMPLETED | OUTPATIENT
Start: 2021-09-25 | End: 2021-09-25

## 2021-09-25 RX ADMIN — ONDANSETRON 4 MG: 2 INJECTION INTRAMUSCULAR; INTRAVENOUS at 08:07

## 2021-09-25 RX ADMIN — SODIUM CHLORIDE 1000 ML: 9 INJECTION, SOLUTION INTRAVENOUS at 08:01

## 2021-09-25 RX ADMIN — KETOROLAC TROMETHAMINE 30 MG: 30 INJECTION, SOLUTION INTRAMUSCULAR; INTRAVENOUS at 08:04

## 2021-09-25 ASSESSMENT — ENCOUNTER SYMPTOMS
ABDOMINAL PAIN: 1
NAUSEA: 1
NECK STIFFNESS: 0
HEADACHES: 0
SHORTNESS OF BREATH: 0
FEVER: 0
DIFFICULTY URINATING: 0
CONFUSION: 0
ARTHRALGIAS: 0
VOMITING: 0
COLOR CHANGE: 0
EYE REDNESS: 0

## 2021-09-25 NOTE — ED TRIAGE NOTES
Woke up from pain RLQ around 5am. Per pt was just diagnosed with PCOS this year. Thinks it is the same pain. Cramping pain 9/10.

## 2021-09-25 NOTE — ED PROVIDER NOTES
ED Provider Note  [facility]      History     Chief Complaint   Patient presents with     Abdominal Pain     HPI  Mercedes Watts is a 21 year old female with a history of recently diagnosed polycystic ovarian syndrome who presents for evaluation of right lower quadrant abdominal pain.  Patient states that she was awakened at 5 AM this morning 2 hours prior to arrival with a dull intermittently sharp and stabbing right lower quadrant abdominal pain.  The pain was nonradiating worse with laying down and improved with standing.  She did not try any medication.  Pain was more intense but somewhat similar to past pain associated with menstrual cycles and has decreased somewhat since it started 2 hours ago.  She notes that she recently went to continuous oral contraception but missed several days last week and had breakthrough bleeding.  She is currently not bleeding and denies other genitourinary symptoms such as vaginal discharge, dysuria, frequency, urgency, hematuria.  She states that associated with the pain, she was nauseous but had no vomiting.  She denied fever, chills, sweats or other ongoing symptoms.  She denies a history of surgeries as well as a family history of renal colic.    Past Medical History  Past Medical History:   Diagnosis Date     Chicken pox      Shingles      Past Surgical History:   Procedure Laterality Date     NO HISTORY OF SURGERY       azelastine (ASTELIN) 0.1 % nasal spray  cetirizine (ZYRTEC) 10 MG tablet  levonorgestrel-ethinyl estradiol (SEASONALE) 0.15-0.03 MG tablet  ondansetron (ZOFRAN ODT) 4 MG ODT tab  triamcinolone (KENALOG) 0.1 % external cream      Allergies   Allergen Reactions     Sulfa Drugs Hives     Cats Itching     Pollen Extract Hives     Family History  Family History   Problem Relation Age of Onset     Hypothyroidism Mother      No Known Problems Father      No Known Problems Sister      No Known Problems Maternal Grandmother       Hypertension Maternal Grandfather      Pulmonary Embolism Paternal Grandmother      No Known Problems Paternal Grandfather      Diabetes Cousin      No Known Problems Other      Social History   Social History     Tobacco Use     Smoking status: Never Smoker     Smokeless tobacco: Never Used   Vaping Use     Vaping Use: Never used   Substance Use Topics     Alcohol use: Yes     Drug use: No      Past medical history, past surgical history, medications, allergies, family history, and social history were reviewed with the patient. No additional pertinent items.       Review of Systems   Constitutional: Negative for fever.   HENT: Negative for congestion.    Eyes: Negative for redness.   Respiratory: Negative for shortness of breath.    Cardiovascular: Negative for chest pain.   Gastrointestinal: Positive for abdominal pain and nausea. Negative for vomiting.   Genitourinary: Negative for difficulty urinating.   Musculoskeletal: Negative for arthralgias and neck stiffness.   Skin: Negative for color change.   Neurological: Negative for headaches.   Psychiatric/Behavioral: Negative for confusion.     A complete review of systems was performed with pertinent positives and negatives noted in the HPI, and all other systems negative.    Physical Exam   BP: 123/73  Pulse: 93  Temp: 98.1  F (36.7  C)  Resp: 16  Weight: 61.1 kg (134 lb 12.8 oz)  SpO2: 100 %  Physical Exam  Constitutional:       General: She is not in acute distress.     Appearance: She is not diaphoretic.   HENT:      Head: Atraumatic.      Mouth/Throat:      Pharynx: No oropharyngeal exudate.   Eyes:      General: No scleral icterus.     Pupils: Pupils are equal, round, and reactive to light.   Cardiovascular:      Heart sounds: Normal heart sounds.   Pulmonary:      Effort: No respiratory distress.      Breath sounds: Normal breath sounds.   Abdominal:      General: Bowel sounds are normal.      Palpations: Abdomen is soft.      Tenderness: There is no  abdominal tenderness.   Musculoskeletal:         General: No tenderness.   Skin:     General: Skin is warm.      Findings: No rash.         ED Course      Procedures       The medical record was reviewed and interpreted.  Current labs reviewed and interpreted.  Previous labs reviewed and interpreted.       Results for orders placed or performed during the hospital encounter of 09/25/21   Comprehensive metabolic panel     Status: Abnormal   Result Value Ref Range    Sodium 139 133 - 144 mmol/L    Potassium 4.4 3.4 - 5.3 mmol/L    Chloride 112 (H) 94 - 109 mmol/L    Carbon Dioxide (CO2) 24 20 - 32 mmol/L    Anion Gap 3 3 - 14 mmol/L    Urea Nitrogen 8 7 - 30 mg/dL    Creatinine 1.00 0.52 - 1.04 mg/dL    Calcium 8.7 8.5 - 10.1 mg/dL    Glucose 123 (H) 70 - 99 mg/dL    Alkaline Phosphatase 40 40 - 150 U/L    AST 11 0 - 45 U/L    ALT 14 0 - 50 U/L    Protein Total 7.3 6.8 - 8.8 g/dL    Albumin 3.8 3.4 - 5.0 g/dL    Bilirubin Total 0.4 0.2 - 1.3 mg/dL    GFR Estimate 80 >60 mL/min/1.73m2   UA with Microscopic reflex to Culture     Status: Abnormal    Specimen: Urine, Midstream   Result Value Ref Range    Color Urine Yellow Colorless, Straw, Light Yellow, Yellow    Appearance Urine Cloudy (A) Clear    Glucose Urine Negative Negative mg/dL    Bilirubin Urine Negative Negative    Ketones Urine Trace (A) Negative mg/dL    Specific Gravity Urine 1.029 1.003 - 1.035    Blood Urine Large (A) Negative    pH Urine 6.0 5.0 - 7.0    Protein Albumin Urine 70  (A) Negative mg/dL    Urobilinogen Urine 2.0 Normal, 2.0 mg/dL    Nitrite Urine Negative Negative    Leukocyte Esterase Urine Large (A) Negative    Bacteria Urine Many (A) None Seen /HPF    Mucus Urine Present (A) None Seen /LPF    RBC Urine >182 (H) <=2 /HPF    WBC Urine 33 (H) <=5 /HPF    Squamous Epithelials Urine 56 (H) <=1 /HPF    Narrative    Urine Culture ordered based on laboratory criteria   CBC with platelets and differential     Status: Abnormal   Result Value Ref  Range    WBC Count 7.6 4.0 - 11.0 10e3/uL    RBC Count 5.05 3.80 - 5.20 10e6/uL    Hemoglobin 12.2 11.7 - 15.7 g/dL    Hematocrit 41.2 35.0 - 47.0 %    MCV 82 78 - 100 fL    MCH 24.2 (L) 26.5 - 33.0 pg    MCHC 29.6 (L) 31.5 - 36.5 g/dL    RDW 15.7 (H) 10.0 - 15.0 %    Platelet Count 300 150 - 450 10e3/uL    % Neutrophils 57 %    % Lymphocytes 30 %    % Monocytes 5 %    % Eosinophils 6 %    % Basophils 1 %    % Immature Granulocytes 1 %    NRBCs per 100 WBC 0 <1 /100    Absolute Neutrophils 4.4 1.6 - 8.3 10e3/uL    Absolute Lymphocytes 2.2 0.8 - 5.3 10e3/uL    Absolute Monocytes 0.4 0.0 - 1.3 10e3/uL    Absolute Eosinophils 0.5 0.0 - 0.7 10e3/uL    Absolute Basophils 0.1 0.0 - 0.2 10e3/uL    Absolute Immature Granulocytes 0.1 (H) <=0.0 10e3/uL    Absolute NRBCs 0.0 10e3/uL   UA with Microscopic reflex to Culture     Status: Abnormal    Specimen: Urine, Midstream   Result Value Ref Range    Color Urine Light Yellow Colorless, Straw, Light Yellow, Yellow    Appearance Urine Clear Clear    Glucose Urine Negative Negative mg/dL    Bilirubin Urine Negative Negative    Ketones Urine 40  (A) Negative mg/dL    Specific Gravity Urine 1.018 1.003 - 1.035    Blood Urine Negative Negative    pH Urine 8.0 (H) 5.0 - 7.0    Protein Albumin Urine Negative Negative mg/dL    Urobilinogen Urine Normal Normal, 2.0 mg/dL    Nitrite Urine Negative Negative    Leukocyte Esterase Urine Trace (A) Negative    Mucus Urine Present (A) None Seen /LPF    RBC Urine 1 <=2 /HPF    WBC Urine 3 <=5 /HPF    Squamous Epithelials Urine 4 (H) <=1 /HPF    Transitional Epithelials Urine <1 <=1 /HPF    Narrative    Urine Culture not indicated   CBC with platelets differential     Status: Abnormal    Narrative    The following orders were created for panel order CBC with platelets differential.  Procedure                               Abnormality         Status                     ---------                               -----------         ------                      CBC with platelets and d...[[id number]]  Abnormal            Final result                 Please view results for these tests on the individual orders.     Medications   ketorolac (TORADOL) injection 30 mg (30 mg Intravenous Given 9/25/21 0804)   ondansetron (ZOFRAN) injection 4 mg (4 mg Intravenous Given 9/25/21 0807)   0.9% sodium chloride BOLUS (0 mLs Intravenous Stopped 9/25/21 0921)        Assessments & Plan (with Medical Decision Making)   21-year-old female who presents with acute onset right lower quadrant abdominal pain.  Differential included diverticulitis, renal colic, tubo-ovarian abscess, ovarian cyst, torsed ovary, appendicitis, enteritis, hernia, obstruction.  Exam revealed no palpable abdominal tenderness.  Initial laboratories revealed a CBC and comprehensive metabolic panel that were unremarkable with exception of slightly elevated glucose.  Initial urinalysis revealed many red and white cells however was contaminated with many squamous epithelial cells.  Repeat urinalysis was grossly unremarkable.  Patient had been treated with a solitary injection of Toradol with complete relief of symptoms.  She was observed over 5-1/2 hours and had no recurrence of pain.  Suspect relatively benign pathology and do not see emergent need for advanced imaging currently.  Patient will return with recurrent symptoms.  Patient notes that she has PCOS and will have similar type but less severe pain frequently associated with nausea.  I have recommended use of Zofran and ibuprofen at home as well as follow-up with OB/gyne.    I have reviewed the nursing notes. I have reviewed the findings, diagnosis, plan and need for follow up with the patient.    Discharge Medication List as of 9/25/2021  1:24 PM      START taking these medications    Details   ondansetron (ZOFRAN ODT) 4 MG ODT tab Take 1 tablet (4 mg) by mouth every 8 hours as needed, Disp-10 tablet, R-0, E-Prescribe             Final diagnoses:   Pelvic pain  in female       --  Scooby Mckay MD  [facility] EMERGENCY DEPARTMENT  9/25/2021     Scooby Mckay MD  09/25/21 1657

## 2021-09-26 ENCOUNTER — HOSPITAL ENCOUNTER (EMERGENCY)
Facility: CLINIC | Age: 21
Discharge: HOME | End: 2021-09-26
Attending: FAMILY MEDICINE | Admitting: FAMILY MEDICINE
Payer: COMMERCIAL

## 2021-09-26 ENCOUNTER — APPOINTMENT (OUTPATIENT)
Dept: ULTRASOUND IMAGING | Facility: CLINIC | Age: 21
End: 2021-09-26
Attending: FAMILY MEDICINE
Payer: COMMERCIAL

## 2021-09-26 VITALS
TEMPERATURE: 98.4 F | SYSTOLIC BLOOD PRESSURE: 124 MMHG | RESPIRATION RATE: 18 BRPM | HEART RATE: 80 BPM | OXYGEN SATURATION: 100 % | WEIGHT: 136.6 LBS | DIASTOLIC BLOOD PRESSURE: 79 MMHG | BODY MASS INDEX: 25.39 KG/M2

## 2021-09-26 DIAGNOSIS — R10.2 PELVIC PAIN: ICD-10-CM

## 2021-09-26 LAB
BACTERIA UR CULT: NORMAL
HCG UR QL: NEGATIVE

## 2021-09-26 PROCEDURE — 99285 EMERGENCY DEPT VISIT HI MDM: CPT | Mod: 25 | Performed by: FAMILY MEDICINE

## 2021-09-26 PROCEDURE — 76830 TRANSVAGINAL US NON-OB: CPT

## 2021-09-26 PROCEDURE — 81025 URINE PREGNANCY TEST: CPT | Performed by: FAMILY MEDICINE

## 2021-09-26 PROCEDURE — 96372 THER/PROPH/DIAG INJ SC/IM: CPT | Performed by: FAMILY MEDICINE

## 2021-09-26 PROCEDURE — 99284 EMERGENCY DEPT VISIT MOD MDM: CPT | Performed by: FAMILY MEDICINE

## 2021-09-26 PROCEDURE — 250N000011 HC RX IP 250 OP 636: Performed by: FAMILY MEDICINE

## 2021-09-26 RX ORDER — KETOROLAC TROMETHAMINE 30 MG/ML
60 INJECTION, SOLUTION INTRAMUSCULAR; INTRAVENOUS ONCE
Status: COMPLETED | OUTPATIENT
Start: 2021-09-26 | End: 2021-09-26

## 2021-09-26 RX ADMIN — KETOROLAC TROMETHAMINE 60 MG: 30 INJECTION, SOLUTION INTRAMUSCULAR at 08:12

## 2021-09-26 NOTE — ED PROVIDER NOTES
History     Chief Complaint   Patient presents with     Abdominal Pain     HPI  Mercedes Watts is a 21 year old female who presents back to the emergency department with continued right-sided pelvic pain. Patient was seen at the [place] emergency department yesterday for this. Had labs done and was given some Toradol. She is in the ER for 6 hours and her pain was relieved. She was discharged home. Sounds like shortly after leaving, she states that her pain spiked back up. She states that she was having some dry heaves this morning so she decided to come here because she had a bad experience there. She has been told recently that she has PCOS and they have been concerned about possible endometriosis on her. They have not scheduled a laparotomy on her yet. This pain is much worse than her normal pain. She has tried some Tylenol overnight with no relief. She did not try ibuprofen. There has been no nausea, vomiting or diarrhea. No recent fevers or chills. No change in urination.    Allergies:  Allergies   Allergen Reactions     Sulfa Drugs Hives     Cats Itching     Pollen Extract Hives       Problem List:    Patient Active Problem List    Diagnosis Date Noted     Allergic rhinitis due to animal hair and dander 03/30/2021     Priority: Medium     Seasonal allergic rhinitis due to pollen 03/30/2021     Priority: Medium     Allergic rhinitis due to dust mite 03/30/2021     Priority: Medium     Intrinsic eczema 03/30/2021     Priority: Medium     Hives 03/30/2021     Priority: Medium     Rash 08/23/2018     Priority: Medium        Past Medical History:    Past Medical History:   Diagnosis Date     Chicken pox      Shingles        Past Surgical History:    Past Surgical History:   Procedure Laterality Date     NO HISTORY OF SURGERY         Family History:    Family History   Problem Relation Age of Onset     Hypothyroidism Mother      No Known Problems Father      No Known Problems Sister      No Known Problems  Maternal Grandmother      Hypertension Maternal Grandfather      Pulmonary Embolism Paternal Grandmother      No Known Problems Paternal Grandfather      Diabetes Cousin      No Known Problems Other        Social History:  Marital Status:  Single [1]  Social History     Tobacco Use     Smoking status: Never Smoker     Smokeless tobacco: Never Used   Vaping Use     Vaping Use: Never used   Substance Use Topics     Alcohol use: Yes     Drug use: No        Medications:    azelastine (ASTELIN) 0.1 % nasal spray  cetirizine (ZYRTEC) 10 MG tablet  levonorgestrel-ethinyl estradiol (SEASONALE) 0.15-0.03 MG tablet  ondansetron (ZOFRAN ODT) 4 MG ODT tab  triamcinolone (KENALOG) 0.1 % external cream          Review of Systems   All other systems reviewed and are negative.      Physical Exam   BP: (!) 153/95  Pulse: 95  Temp: 98.4  F (36.9  C)  Resp: 18  Weight: 62 kg (136 lb 9.6 oz)  SpO2: 100 %      Physical Exam  Vitals and nursing note reviewed.   Constitutional:       General: She is not in acute distress.     Appearance: She is well-developed. She is not diaphoretic.   HENT:      Head: Normocephalic and atraumatic.      Nose: Nose normal.      Mouth/Throat:      Pharynx: No oropharyngeal exudate.   Eyes:      Conjunctiva/sclera: Conjunctivae normal.   Cardiovascular:      Rate and Rhythm: Normal rate and regular rhythm.      Heart sounds: Normal heart sounds. No murmur heard.   No friction rub.   Pulmonary:      Effort: Pulmonary effort is normal. No respiratory distress.      Breath sounds: Normal breath sounds. No stridor. No wheezing or rales.   Abdominal:      General: Bowel sounds are normal. There is no distension.      Palpations: Abdomen is soft. There is no mass.      Tenderness: There is no abdominal tenderness. There is no guarding.      Comments: Patient has more right pelvic pain versus abdominal pain.   Musculoskeletal:         General: No tenderness. Normal range of motion.      Cervical back: Normal range  of motion and neck supple.   Skin:     General: Skin is warm and dry.      Capillary Refill: Capillary refill takes less than 2 seconds.      Findings: No erythema.   Neurological:      Mental Status: She is alert and oriented to person, place, and time.   Psychiatric:         Judgment: Judgment normal.         ED Course        Procedures        Results for orders placed or performed during the hospital encounter of 09/26/21 (from the past 24 hour(s))   HCG qualitative urine (UPT)   Result Value Ref Range    hCG Urine Qualitative Negative Negative   US Pelvis Cmplt w Transvag & Doppler LmtPel Duplex Limited    Narrative    EXAM: ULTRASOUND PELVIS COMPLETE WITH TRANSVAGINAL AND DOPPLER LIMITED  LOCATION: [facility]  DATE/TIME: 09/26/2021, 8:47 AM    INDICATION: Right pelvic pain, history of ovarian cyst.  COMPARISON: None.  TECHNIQUE: Transabdominal scans were performed. Endovaginal ultrasound was performed to better visualize the adnexa. Color flow with spectral Doppler and waveform analysis performed.    FINDINGS:    UTERUS: 7.9 x 4.0 x 4.9 cm. Normal in size and position with no masses.    ENDOMETRIUM: 5 mm. Normal smooth endometrium.    RIGHT OVARY: 2.1 x 2.4 x 2.1 cm. Normal with arterial and venous duplex flow identified.    LEFT OVARY: 3.0 x 1.8 x 2.0 cm. Normal with arterial and venous duplex flow identified.    No significant free fluid.      Impression    IMPRESSION:    1.  No acute process demonstrated.         Medications   ketorolac (TORADOL) injection 60 mg (has no administration in time range)       Ultrasound showed no signs of an ovarian cyst or acute process in the belly.  Patient's pain is significantly better after the Toradol that was given earlier.  At this point I am not sure exactly what is causing the pelvic pain.  This could be more endometriosis or related to her history of dysmenorrhea.  We discussed possible further testing to look into this right-sided  belly pain.  She had a normal white count yesterday so I do not think this is likely appendicitis or other etiology.  We discussed getting the CT scan but she would like to hold off as she thinks that this is most likely related to possible endometriosis.  She will be following up with her OB doctor later on this week.  I recommend that she continue to use ibuprofen and can alternate Tylenol with this.  Patient will return if her symptoms do worsen.  All questions were answered and patient was happy with this plan.    Assessments & Plan (with Medical Decision Making)  Pelvic pain     I have reviewed the nursing notes.    I have reviewed the findings, diagnosis, plan and need for follow up with the patient.              9/26/2021   [facility] EMERGENCY DEPT     Amilcar Saenz MD  09/26/21 1678

## 2021-09-26 NOTE — ED TRIAGE NOTES
She has RLQ pain that started yesterday at 0615. She was seen at   [facility] and had some blood and urine tests done and received some pain medication but did not find out what the problem was.  She was sent home on OTC pain meds which have not controlled her pain.

## 2021-10-07 ENCOUNTER — OFFICE VISIT (OUTPATIENT)
Dept: MIDWIFE SERVICES | Facility: CLINIC | Age: 21
End: 2021-10-07
Payer: COMMERCIAL

## 2021-10-07 VITALS
BODY MASS INDEX: 25.28 KG/M2 | HEART RATE: 98 BPM | OXYGEN SATURATION: 100 % | DIASTOLIC BLOOD PRESSURE: 73 MMHG | WEIGHT: 136 LBS | SYSTOLIC BLOOD PRESSURE: 133 MMHG

## 2021-10-07 DIAGNOSIS — N80.9 ENDOMETRIOSIS: Primary | ICD-10-CM

## 2021-10-07 DIAGNOSIS — E28.2 PCOS (POLYCYSTIC OVARIAN SYNDROME): ICD-10-CM

## 2021-10-07 PROCEDURE — 99213 OFFICE O/P EST LOW 20 MIN: CPT | Performed by: ADVANCED PRACTICE MIDWIFE

## 2021-10-07 NOTE — PROGRESS NOTES
Mercedes Watts presents to the clinic for f/u to her ED visit. She was scheduled with me on [facility] thinking she was an OB/GYN.     HPI: Gaby has a Hx of painful and irregular periods. She has PCOS and was recently prescribed a continous dose of Seasonale OCP. It has been suggested before that she also has endometriosis. She has been taking her OCP 2.5 months now. In the middle of September she missed two days of her OCP r/t her grandmother being hospitalized and her being away from her medication. One week later she started her period and then  severe right-sided pelvic pain for which she presented to the emergency department twice. Her pelvic imaging was unremarkable at that time and the Ed treated her for a flare-up of endometriosis with continuous ibuprofen and zofran. Ultimately her pain was controlled from 9/26-10/6. Yesterday on 10/6 she tapered her Ibuprofen and today she is pain free and not taking Ibuprofen.     O: /73   Pulse 98   Wt 61.7 kg (136 lb)   LMP 09/15/2021   SpO2 100%   Breastfeeding No   BMI 25.28 kg/m    Mentation normal and bright  No physical exam done      A:   (E28.2) PCOS (polycystic ovarian syndrome)  Comment: Continuous OCPs. Last ultrasound on 9/26 there were no Ovarian cysts  Plan: Continue OCPs    (N80.9) Endometriosis  Comment: Likely flare-up after 2 days missed of OCPs  Plan: Recommend continuous OCPs and f/u in January with dr Cesar to consider whether she needs an additional therapy for endometriosis.    Plan: See above. RTC in 4 months for OB/GYN appointment.     Wanda Martino, PRIMITIVO, KADE LANGFORD

## 2022-01-20 ENCOUNTER — OFFICE VISIT (OUTPATIENT)
Dept: OBGYN | Facility: CLINIC | Age: 22
End: 2022-01-20
Payer: COMMERCIAL

## 2022-01-20 VITALS
HEART RATE: 87 BPM | BODY MASS INDEX: 25.43 KG/M2 | DIASTOLIC BLOOD PRESSURE: 69 MMHG | SYSTOLIC BLOOD PRESSURE: 146 MMHG | WEIGHT: 136.8 LBS

## 2022-01-20 DIAGNOSIS — Z23 NEED FOR PROPHYLACTIC VACCINATION AND INOCULATION AGAINST INFLUENZA: ICD-10-CM

## 2022-01-20 DIAGNOSIS — N94.6 DYSMENORRHEA: Primary | ICD-10-CM

## 2022-01-20 PROCEDURE — 99214 OFFICE O/P EST MOD 30 MIN: CPT | Mod: 25 | Performed by: OBSTETRICS & GYNECOLOGY

## 2022-01-20 PROCEDURE — 90686 IIV4 VACC NO PRSV 0.5 ML IM: CPT | Performed by: OBSTETRICS & GYNECOLOGY

## 2022-01-20 PROCEDURE — 90471 IMMUNIZATION ADMIN: CPT | Performed by: OBSTETRICS & GYNECOLOGY

## 2022-01-20 RX ORDER — ONDANSETRON 4 MG/1
4 TABLET, ORALLY DISINTEGRATING ORAL EVERY 6 HOURS PRN
Qty: 20 TABLET | Refills: 0 | Status: SHIPPED | OUTPATIENT
Start: 2022-01-20 | End: 2023-12-20

## 2022-01-20 NOTE — LETTER
[place] [id number] [place] [id number]  [phone]      January 20, 2022      Regarding:  Merceeds Watts  YOB: 2000  [address]  [facility] [id number]    To whom it may concern:      Mercedes Watts is my patient and has underlying health condition that may result in her needing to miss a day of work now and then or have intermittent fatigue episodes.      Please do not hesitate to contact me if you have questions.    Sincerely,          Melissa Cesar MD
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
## 2022-03-12 ENCOUNTER — HEALTH MAINTENANCE LETTER (OUTPATIENT)
Age: 22
End: 2022-03-12

## 2022-05-21 ENCOUNTER — MYC MEDICAL ADVICE (OUTPATIENT)
Dept: OBGYN | Facility: CLINIC | Age: 22
End: 2022-05-21
Payer: COMMERCIAL

## 2022-06-13 ENCOUNTER — OFFICE VISIT (OUTPATIENT)
Dept: OBGYN | Facility: CLINIC | Age: 22
End: 2022-06-13
Payer: COMMERCIAL

## 2022-06-13 VITALS
SYSTOLIC BLOOD PRESSURE: 126 MMHG | BODY MASS INDEX: 26.81 KG/M2 | DIASTOLIC BLOOD PRESSURE: 65 MMHG | WEIGHT: 144.2 LBS | OXYGEN SATURATION: 99 % | HEART RATE: 84 BPM

## 2022-06-13 DIAGNOSIS — N94.6 DYSMENORRHEA: Primary | ICD-10-CM

## 2022-06-13 PROCEDURE — 99213 OFFICE O/P EST LOW 20 MIN: CPT | Performed by: OBSTETRICS & GYNECOLOGY

## 2022-06-13 RX ORDER — LEVONORGESTREL AND ETHINYL ESTRADIOL 0.15-0.03
1 KIT ORAL DAILY
Qty: 112 TABLET | Refills: 3 | Status: SHIPPED | OUTPATIENT
Start: 2022-06-13 | End: 2023-07-19
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
## 2023-01-07 ENCOUNTER — HEALTH MAINTENANCE LETTER (OUTPATIENT)
Age: 23
End: 2023-01-07

## 2023-04-22 ENCOUNTER — HEALTH MAINTENANCE LETTER (OUTPATIENT)
Age: 23
End: 2023-04-22

## 2023-07-19 ENCOUNTER — OFFICE VISIT (OUTPATIENT)
Dept: OBGYN | Facility: CLINIC | Age: 23
End: 2023-07-19
Payer: COMMERCIAL

## 2023-07-19 VITALS
SYSTOLIC BLOOD PRESSURE: 127 MMHG | BODY MASS INDEX: 29.38 KG/M2 | DIASTOLIC BLOOD PRESSURE: 70 MMHG | HEART RATE: 83 BPM | HEIGHT: 61 IN | OXYGEN SATURATION: 100 % | WEIGHT: 155.6 LBS

## 2023-07-19 DIAGNOSIS — E28.2 PCOS (POLYCYSTIC OVARIAN SYNDROME): ICD-10-CM

## 2023-07-19 DIAGNOSIS — Z13.29 SCREENING FOR THYROID DISORDER: ICD-10-CM

## 2023-07-19 DIAGNOSIS — Z13.0 SCREENING, ANEMIA, DEFICIENCY, IRON: ICD-10-CM

## 2023-07-19 DIAGNOSIS — Z01.419 ENCOUNTER FOR GYNECOLOGICAL EXAMINATION WITHOUT ABNORMAL FINDING: Primary | ICD-10-CM

## 2023-07-19 DIAGNOSIS — Z13.1 SCREENING FOR DIABETES MELLITUS: ICD-10-CM

## 2023-07-19 DIAGNOSIS — Z11.3 ROUTINE SCREENING FOR STI (SEXUALLY TRANSMITTED INFECTION): ICD-10-CM

## 2023-07-19 DIAGNOSIS — Z13.220 SCREENING FOR LIPID DISORDERS: ICD-10-CM

## 2023-07-19 DIAGNOSIS — Z13.21 ENCOUNTER FOR VITAMIN DEFICIENCY SCREENING: ICD-10-CM

## 2023-07-19 LAB
CHOLEST SERPL-MCNC: 182 MG/DL
ERYTHROCYTE [DISTWIDTH] IN BLOOD BY AUTOMATED COUNT: 12.3 % (ref 10–15)
ESTRADIOL SERPL-MCNC: 73 PG/ML
FASTING STATUS PATIENT QL REPORTED: NO
GLUCOSE SERPL-MCNC: 81 MG/DL (ref 70–99)
HBA1C MFR BLD: 5.4 % (ref 0–5.6)
HCT VFR BLD AUTO: 43.5 % (ref 35–47)
HDLC SERPL-MCNC: 69 MG/DL
HGB BLD-MCNC: 13.9 G/DL (ref 11.7–15.7)
LDLC SERPL CALC-MCNC: 104 MG/DL
MCH RBC QN AUTO: 28.4 PG (ref 26.5–33)
MCHC RBC AUTO-ENTMCNC: 32 G/DL (ref 31.5–36.5)
MCV RBC AUTO: 89 FL (ref 78–100)
NONHDLC SERPL-MCNC: 113 MG/DL
PLATELET # BLD AUTO: 225 10E3/UL (ref 150–450)
PROGEST SERPL-MCNC: 0.6 NG/ML
RBC # BLD AUTO: 4.9 10E6/UL (ref 3.8–5.2)
TRIGL SERPL-MCNC: 47 MG/DL
TSH SERPL DL<=0.005 MIU/L-ACNC: 2.01 UIU/ML (ref 0.3–4.2)
WBC # BLD AUTO: 5.6 10E3/UL (ref 4–11)

## 2023-07-19 PROCEDURE — 82670 ASSAY OF TOTAL ESTRADIOL: CPT | Performed by: OBSTETRICS & GYNECOLOGY

## 2023-07-19 PROCEDURE — 80061 LIPID PANEL: CPT | Performed by: OBSTETRICS & GYNECOLOGY

## 2023-07-19 PROCEDURE — 84443 ASSAY THYROID STIM HORMONE: CPT | Performed by: OBSTETRICS & GYNECOLOGY

## 2023-07-19 PROCEDURE — 99395 PREV VISIT EST AGE 18-39: CPT | Performed by: OBSTETRICS & GYNECOLOGY

## 2023-07-19 PROCEDURE — 87340 HEPATITIS B SURFACE AG IA: CPT | Performed by: OBSTETRICS & GYNECOLOGY

## 2023-07-19 PROCEDURE — 86780 TREPONEMA PALLIDUM: CPT | Performed by: OBSTETRICS & GYNECOLOGY

## 2023-07-19 PROCEDURE — 85027 COMPLETE CBC AUTOMATED: CPT | Performed by: OBSTETRICS & GYNECOLOGY

## 2023-07-19 PROCEDURE — 84144 ASSAY OF PROGESTERONE: CPT | Performed by: OBSTETRICS & GYNECOLOGY

## 2023-07-19 PROCEDURE — 86803 HEPATITIS C AB TEST: CPT | Performed by: OBSTETRICS & GYNECOLOGY

## 2023-07-19 PROCEDURE — 87591 N.GONORRHOEAE DNA AMP PROB: CPT | Performed by: OBSTETRICS & GYNECOLOGY

## 2023-07-19 PROCEDURE — 87491 CHLMYD TRACH DNA AMP PROBE: CPT | Performed by: OBSTETRICS & GYNECOLOGY

## 2023-07-19 PROCEDURE — 87389 HIV-1 AG W/HIV-1&-2 AB AG IA: CPT | Performed by: OBSTETRICS & GYNECOLOGY

## 2023-07-19 PROCEDURE — 83036 HEMOGLOBIN GLYCOSYLATED A1C: CPT | Performed by: OBSTETRICS & GYNECOLOGY

## 2023-07-19 PROCEDURE — 82947 ASSAY GLUCOSE BLOOD QUANT: CPT | Performed by: OBSTETRICS & GYNECOLOGY

## 2023-07-19 PROCEDURE — 36415 COLL VENOUS BLD VENIPUNCTURE: CPT | Performed by: OBSTETRICS & GYNECOLOGY

## 2023-07-19 PROCEDURE — 82306 VITAMIN D 25 HYDROXY: CPT | Performed by: OBSTETRICS & GYNECOLOGY

## 2023-07-19 ASSESSMENT — ANXIETY QUESTIONNAIRES
GAD7 TOTAL SCORE: 1
7. FEELING AFRAID AS IF SOMETHING AWFUL MIGHT HAPPEN: NOT AT ALL
3. WORRYING TOO MUCH ABOUT DIFFERENT THINGS: NOT AT ALL
5. BEING SO RESTLESS THAT IT IS HARD TO SIT STILL: NOT AT ALL
GAD7 TOTAL SCORE: 1
6. BECOMING EASILY ANNOYED OR IRRITABLE: SEVERAL DAYS
2. NOT BEING ABLE TO STOP OR CONTROL WORRYING: NOT AT ALL
1. FEELING NERVOUS, ANXIOUS, OR ON EDGE: NOT AT ALL

## 2023-07-19 ASSESSMENT — PATIENT HEALTH QUESTIONNAIRE - PHQ9
5. POOR APPETITE OR OVEREATING: NOT AT ALL
SUM OF ALL RESPONSES TO PHQ QUESTIONS 1-9: 0

## 2023-07-19 NOTE — PROGRESS NOTES
Mercedes is a 23 year old  female who presents for annual exam.     Menses are regular q 28-30 days and normal lasting 7 days.  Menses flow: normal.  Patient's last menstrual period was 2023.. Using condoms for contraception.  She is not currently considering pregnancy.  Besides routine health maintenance, she has no other health concerns today .  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  Is seeing a naturopath on the side and has a few labs she would like to have done.  Did take the orlissa for approximately 1 year and it definitely helped her pain but she also gained 20 pounds.  Came off of it approximately 1 month ago and now is hoping to see what her natural body wants to do as far as cycles given her PCOS.  Is exercising at least 30 minutes a day.  Also did notice some frequency and urgency but hoping that resolves off of the medication.  Once she knows what her body will do, then may want to use some type of birth control to help prevent endometriosis from causing pain again.  GYNECOLOGIC HISTORY:  Menarche:   Mercedes is sexually active with 1male partner(s) and is currently in monogamous relationship.    History sexually transmitted infections:No STD history  STI testing offered?  Accepted  LASHELL exposure: Unknown  History of abnormal Pap smear: NO - age 21-29 PAP every 3 years recommended  Family history of breast CA: No  Family history of uterine/ovarian CA: No    Family history of colon CA: No    HEALTH MAINTENANCE:  Cholesterol: (No results found for: CHOL History of abnormal lipids: No  Mammo: na . History of abnormal Mammo: Not applicable.  Regular Self Breast Exams: No  Calcium/Vitamin D intake: source:  , vit d Adequate? Yes  TSH: (  TSH   Date Value Ref Range Status   2021 1.94 0.40 - 4.00 mU/L Final    )  Pap; (  Lab Results   Component Value Date    PAP NIL 2021    )    HISTORY:  OB History    Para Term  AB Living   0 0 0 0  "0 0   SAB IAB Ectopic Multiple Live Births   0 0 0 0 0     Past Medical History:   Diagnosis Date     Chicken pox      Shingles      Past Surgical History:   Procedure Laterality Date     NO HISTORY OF SURGERY       Family History   Problem Relation Age of Onset     Hypothyroidism Mother      No Known Problems Father      No Known Problems Sister      No Known Problems Maternal Grandmother      Hypertension Maternal Grandfather      Pulmonary Embolism Paternal Grandmother      No Known Problems Paternal Grandfather      Diabetes Cousin      No Known Problems Other      Social History     Socioeconomic History     Marital status: Single   Tobacco Use     Smoking status: Never     Smokeless tobacco: Never   Vaping Use     Vaping Use: Never used   Substance and Sexual Activity     Alcohol use: Yes     Drug use: No     Sexual activity: Yes     Partners: Male     Birth control/protection: Pill, Condom   Other Topics Concern     Parent/sibling w/ CABG, MI or angioplasty before 65F 55M? No       Current Outpatient Medications:      VITAMIN D PO, , Disp: , Rfl:      azelastine (ASTELIN) 0.1 % nasal spray, Spray 2 sprays into both nostrils 2 times daily, Disp: 30 mL, Rfl: 3     cetirizine (ZYRTEC) 10 MG tablet, Take 10 mg by mouth daily As needed for allergies, Disp: , Rfl:      ondansetron (ZOFRAN-ODT) 4 MG ODT tab, Take 1 tablet (4 mg) by mouth every 6 hours as needed for nausea, Disp: 20 tablet, Rfl: 0     triamcinolone (KENALOG) 0.1 % external cream, Apply topically 2 times daily, Disp: 454 g, Rfl: 1     Allergies   Allergen Reactions     Sulfa Antibiotics Hives     Cats Itching     Pollen Extract Hives       Past medical, surgical, social and family history were reviewed and updated in [facility].      EXAM:  /70   Pulse 83   Ht 1.549 m (5' 1\")   Wt 70.6 kg (155 lb 9.6 oz)   LMP 06/17/2023   SpO2 100%   BMI 29.40 kg/m     BMI: Body mass index is 29.4 kg/m .  Constitutional: healthy, alert and no distress  Head: " Normocephalic. No masses, lesions, tenderness or abnormalities  Neck: Neck supple. Trachea midline. No adenopathy. Thyroid symmetric, normal size.   Cardiovascular: RRR.   Respiratory: Negative.   Breast: No nodularity, asymmetry or nipple discharge bilaterally.  Gastrointestinal: Abdomen soft, non-tender, non-distended. No masses, organomegaly.  :  Vulva:  No external lesions, normal female hair distribution, no inguinal adenopathy.    Urethra:  Midline, non-tender, well supported, no discharge  Vagina:  Moist, pink, no abnormal discharge, no lesions  Musculoskeletal: extremities normal  Skin: no suspicious lesions or rashes  Psychiatric: Affect appropriate, cooperative,mentation appears normal.     COUNSELING:   Reviewed preventive health counseling, as reflected in patient instructions       Contraception   reports that she has never smoked. She has never used smokeless tobacco.    Body mass index is 29.4 kg/m .  Weight management plan: just stopped orlissa and hoping weight will drop as well  FRAX Risk Assessment    ASSESSMENT:  23 year old female with satisfactory annual exam  (Z01.419) Encounter for gynecological examination without abnormal finding  (primary encounter diagnosis)  Comment: condoms  Plan: Discussed possible Kyleena IUD versus NuvaRing for cycle control in the future and she will consider and let me know if wants to start anything    (Z11.3) Routine screening for STI (sexually transmitted infection)  Comment: pt request  Plan: NEISSERIA GONORRHOEA PCR, CHLAMYDIA TRACHOMATIS        PCR, Treponema Abs w Reflex to RPR and Titer,         HIV Antigen Antibody Combo Cascade, Hepatitis B        surface antigen, Hepatitis C antibody        Check labs today    (Z13.29) Screening for thyroid disorder  Comment: borderline in past  Plan: TSH with free T4 reflex        Check lab today    (Z13.21) Encounter for vitamin deficiency screening  Plan: Vitamin D Deficiency        Check lab today    (E28.2) PCOS  (polycystic ovarian syndrome)  Plan: Estradiol, Progesterone        Check lab today    (Z13.1) Screening for diabetes mellitus  Comment: PCOS  Plan: Glucose, Hemoglobin A1c        Check lab today    (Z13.0) Screening, anemia, deficiency, iron  Plan: CBC with platelets        Check lab today    (Z13.220) Screening for lipid disorders  Comment: not fasting  Plan: Lipid panel reflex to direct LDL Non-fasting        Check lab today      Melissa Cesar MD

## 2023-07-20 LAB
C TRACH DNA SPEC QL NAA+PROBE: NEGATIVE
DEPRECATED CALCIDIOL+CALCIFEROL SERPL-MC: 52 UG/L (ref 20–75)
N GONORRHOEA DNA SPEC QL NAA+PROBE: NEGATIVE

## 2023-07-21 LAB
HBV SURFACE AG SERPL QL IA: NONREACTIVE
HCV AB SERPL QL IA: NONREACTIVE
HIV 1+2 AB+HIV1 P24 AG SERPL QL IA: NONREACTIVE
T PALLIDUM AB SER QL: NONREACTIVE

## 2023-10-24 ENCOUNTER — OFFICE VISIT (OUTPATIENT)
Dept: URGENT CARE | Facility: URGENT CARE | Age: 23
End: 2023-10-24
Payer: COMMERCIAL

## 2023-10-24 VITALS
OXYGEN SATURATION: 100 % | SYSTOLIC BLOOD PRESSURE: 128 MMHG | TEMPERATURE: 99 F | RESPIRATION RATE: 16 BRPM | DIASTOLIC BLOOD PRESSURE: 68 MMHG | HEART RATE: 89 BPM

## 2023-10-24 DIAGNOSIS — H10.023 PINK EYE DISEASE OF BOTH EYES: Primary | ICD-10-CM

## 2023-10-24 PROCEDURE — 99213 OFFICE O/P EST LOW 20 MIN: CPT | Performed by: FAMILY MEDICINE

## 2023-10-24 RX ORDER — TOBRAMYCIN 3 MG/ML
2 SOLUTION/ DROPS OPHTHALMIC 4 TIMES DAILY
Qty: 5 ML | Refills: 0 | Status: SHIPPED | OUTPATIENT
Start: 2023-10-24 | End: 2023-10-31

## 2023-10-24 RX ORDER — TOBRAMYCIN 3 MG/ML
2 SOLUTION/ DROPS OPHTHALMIC 4 TIMES DAILY
Qty: 5 ML | Refills: 0 | Status: SHIPPED | OUTPATIENT
Start: 2023-10-24 | End: 2023-10-24

## 2023-10-24 NOTE — PROGRESS NOTES
SUBJECTIVE:Chief Complaint:   Chief Complaint   Patient presents with    Sinus Problem     6 days, sinus congestion/drainage, bilateral eye redness/discharge, irritation, neg covid      History of Present Illness: Mercedes Watts is a 23 year old female who presents complaining of both eyes mattering and redness   Contact wearer : No    Past Medical History:   Diagnosis Date    Chicken pox     Shingles      Allergies   Allergen Reactions    Sulfa Antibiotics Hives    Cats Itching    Pollen Extract Hives     Social History     Tobacco Use    Smoking status: Never    Smokeless tobacco: Never   Substance Use Topics    Alcohol use: Yes     Comment: occ       ROS:  negative for photophobia, pain, vision change    OBJECTIVE:  /68   Pulse 89   Temp 99  F (37.2  C)   Resp 16   SpO2 100%    General: no acute distress  Eye exam: right eye abnormal findings: conjunctivitis with erythema, discharge and matting noted, left eye abnormal findings: conjunctivitis with erythema, discharge and matting noted.  SUHAS, EOMI, fundi normal, corneas normal, no foreign bodies, visual acuity normal both eyes, no periorbital cellulitis      ICD-10-CM    1. Pink eye disease of both eyes  H10.023 tobramycin (TOBREX) 0.3 % ophthalmic solution          Warm packs for comfort.

## 2023-12-20 ENCOUNTER — MYC REFILL (OUTPATIENT)
Dept: OBGYN | Facility: CLINIC | Age: 23
End: 2023-12-20
Payer: COMMERCIAL

## 2023-12-20 DIAGNOSIS — N94.6 DYSMENORRHEA: ICD-10-CM

## 2023-12-20 RX ORDER — ONDANSETRON 4 MG/1
4 TABLET, ORALLY DISINTEGRATING ORAL EVERY 6 HOURS PRN
Qty: 20 TABLET | Refills: 0 | Status: SHIPPED | OUTPATIENT
Start: 2023-12-20

## 2023-12-20 NOTE — TELEPHONE ENCOUNTER
"Requested Prescriptions   Pending Prescriptions Disp Refills    ondansetron (ZOFRAN ODT) 4 MG ODT tab 20 tablet 0     Sig: Take 1 tablet (4 mg) by mouth every 6 hours as needed for nausea        Antivertigo/Antiemetic Agents Passed - 12/20/2023 10:36 AM        Passed - Recent (12 mo) or future (30 days) visit within the authorizing provider's specialty     Patient has had an office visit with the authorizing provider or a provider within the authorizing providers department within the previous 12 mos or has a future within next 30 days. See \"Patient Info\" tab in inbasket, or \"Choose Columns\" in Meds & Orders section of the refill encounter.              Passed - Medication is active on med list        Passed - Patient is 18 years of age or older           Last Written Prescription Date:  1/20/22  Last Fill Quantity: 20,  # refills: 0   Last office visit: 7/19/2023 ; last virtual visit: Visit date not found with prescribing provider:  Dr. Cesar   Future Office Visit:      Since last dose prescribed almost 2 years okay-okay to send In refil?  Routing to provider to advise.  Lisa Case RN on 12/20/2023 at 10:55 AM          "

## 2024-03-13 ENCOUNTER — E-VISIT (OUTPATIENT)
Dept: URGENT CARE | Facility: CLINIC | Age: 24
End: 2024-03-13
Payer: COMMERCIAL

## 2024-03-13 DIAGNOSIS — R06.02 SOB (SHORTNESS OF BREATH): Primary | ICD-10-CM

## 2024-03-13 PROCEDURE — 99207 PR NON-BILLABLE SERV PER CHARTING: CPT | Performed by: PHYSICIAN ASSISTANT

## 2024-03-13 NOTE — PATIENT INSTRUCTIONS
Dear Mercedes Watts,    We are sorry you are not feeling well. Based on the responses you provided, it is recommended that you be seen in-person in urgent care so we can better evaluate your symptoms. Please click here to find the nearest urgent care location to you.   You will not be charged for this Visit. Thank you for trusting us with your care.    [place] PA-C

## 2024-08-22 SDOH — HEALTH STABILITY: PHYSICAL HEALTH: ON AVERAGE, HOW MANY MINUTES DO YOU ENGAGE IN EXERCISE AT THIS LEVEL?: 30 MIN

## 2024-08-22 SDOH — HEALTH STABILITY: PHYSICAL HEALTH: ON AVERAGE, HOW MANY DAYS PER WEEK DO YOU ENGAGE IN MODERATE TO STRENUOUS EXERCISE (LIKE A BRISK WALK)?: 5 DAYS

## 2024-08-22 ASSESSMENT — SOCIAL DETERMINANTS OF HEALTH (SDOH): HOW OFTEN DO YOU GET TOGETHER WITH FRIENDS OR RELATIVES?: MORE THAN THREE TIMES A WEEK

## 2024-08-27 ENCOUNTER — OFFICE VISIT (OUTPATIENT)
Dept: INTERNAL MEDICINE | Facility: CLINIC | Age: 24
End: 2024-08-27
Payer: COMMERCIAL

## 2024-08-27 VITALS
DIASTOLIC BLOOD PRESSURE: 68 MMHG | HEIGHT: 63 IN | HEART RATE: 110 BPM | RESPIRATION RATE: 16 BRPM | SYSTOLIC BLOOD PRESSURE: 124 MMHG | WEIGHT: 161.2 LBS | BODY MASS INDEX: 28.56 KG/M2 | OXYGEN SATURATION: 99 % | TEMPERATURE: 98.4 F

## 2024-08-27 DIAGNOSIS — Z00.00 ENCOUNTER FOR PREVENTIVE HEALTH EXAMINATION: Primary | ICD-10-CM

## 2024-08-27 DIAGNOSIS — Z12.4 SCREENING FOR CERVICAL CANCER: ICD-10-CM

## 2024-08-27 DIAGNOSIS — E03.8 SUBCLINICAL HYPOTHYROIDISM: ICD-10-CM

## 2024-08-27 DIAGNOSIS — N80.9 ENDOMETRIOSIS: ICD-10-CM

## 2024-08-27 DIAGNOSIS — E28.2 PCOS (POLYCYSTIC OVARIAN SYNDROME): ICD-10-CM

## 2024-08-27 LAB
ALBUMIN SERPL BCG-MCNC: 4.5 G/DL (ref 3.5–5.2)
ALP SERPL-CCNC: 58 U/L (ref 40–150)
ALT SERPL W P-5'-P-CCNC: 10 U/L (ref 0–50)
ANION GAP SERPL CALCULATED.3IONS-SCNC: 11 MMOL/L (ref 7–15)
AST SERPL W P-5'-P-CCNC: 21 U/L (ref 0–45)
BASOPHILS # BLD AUTO: 0 10E3/UL (ref 0–0.2)
BASOPHILS NFR BLD AUTO: 1 %
BILIRUB SERPL-MCNC: 0.4 MG/DL
BUN SERPL-MCNC: 11.4 MG/DL (ref 6–20)
CALCIUM SERPL-MCNC: 9.1 MG/DL (ref 8.8–10.4)
CHLORIDE SERPL-SCNC: 104 MMOL/L (ref 98–107)
CHOLEST SERPL-MCNC: 173 MG/DL
CREAT SERPL-MCNC: 0.92 MG/DL (ref 0.51–0.95)
EGFRCR SERPLBLD CKD-EPI 2021: 88 ML/MIN/1.73M2
EOSINOPHIL # BLD AUTO: 0.4 10E3/UL (ref 0–0.7)
EOSINOPHIL NFR BLD AUTO: 8 %
ERYTHROCYTE [DISTWIDTH] IN BLOOD BY AUTOMATED COUNT: 13 % (ref 10–15)
ESTRADIOL SERPL-MCNC: 240 PG/ML
FASTING STATUS PATIENT QL REPORTED: YES
FASTING STATUS PATIENT QL REPORTED: YES
GLUCOSE SERPL-MCNC: 113 MG/DL (ref 70–99)
HCO3 SERPL-SCNC: 23 MMOL/L (ref 22–29)
HCT VFR BLD AUTO: 42.8 % (ref 35–47)
HDLC SERPL-MCNC: 64 MG/DL
HGB BLD-MCNC: 14.4 G/DL (ref 11.7–15.7)
IMM GRANULOCYTES # BLD: 0 10E3/UL
IMM GRANULOCYTES NFR BLD: 0 %
LDLC SERPL CALC-MCNC: 100 MG/DL
LYMPHOCYTES # BLD AUTO: 1.6 10E3/UL (ref 0.8–5.3)
LYMPHOCYTES NFR BLD AUTO: 30 %
MCH RBC QN AUTO: 29.2 PG (ref 26.5–33)
MCHC RBC AUTO-ENTMCNC: 33.6 G/DL (ref 31.5–36.5)
MCV RBC AUTO: 87 FL (ref 78–100)
MONOCYTES # BLD AUTO: 0.3 10E3/UL (ref 0–1.3)
MONOCYTES NFR BLD AUTO: 6 %
NEUTROPHILS # BLD AUTO: 3 10E3/UL (ref 1.6–8.3)
NEUTROPHILS NFR BLD AUTO: 55 %
NONHDLC SERPL-MCNC: 109 MG/DL
PLATELET # BLD AUTO: 240 10E3/UL (ref 150–450)
POTASSIUM SERPL-SCNC: 4.2 MMOL/L (ref 3.4–5.3)
PROGEST SERPL-MCNC: 0.8 NG/ML
PROT SERPL-MCNC: 7.2 G/DL (ref 6.4–8.3)
RBC # BLD AUTO: 4.93 10E6/UL (ref 3.8–5.2)
SHBG SERPL-SCNC: 59 NMOL/L (ref 30–135)
SHBG SERPL-SCNC: 59 NMOL/L (ref 30–135)
SODIUM SERPL-SCNC: 138 MMOL/L (ref 135–145)
T3FREE SERPL-MCNC: 3.2 PG/ML (ref 2–4.4)
T4 FREE SERPL-MCNC: 0.81 NG/DL (ref 0.9–1.7)
TRIGL SERPL-MCNC: 44 MG/DL
TSH SERPL DL<=0.005 MIU/L-ACNC: 5.15 UIU/ML (ref 0.3–4.2)
VIT D+METAB SERPL-MCNC: 46 NG/ML (ref 20–50)
WBC # BLD AUTO: 5.5 10E3/UL (ref 4–11)

## 2024-08-27 PROCEDURE — 84270 ASSAY OF SEX HORMONE GLOBUL: CPT | Performed by: NURSE PRACTITIONER

## 2024-08-27 PROCEDURE — 99395 PREV VISIT EST AGE 18-39: CPT | Performed by: NURSE PRACTITIONER

## 2024-08-27 PROCEDURE — 84144 ASSAY OF PROGESTERONE: CPT | Performed by: NURSE PRACTITIONER

## 2024-08-27 PROCEDURE — 85025 COMPLETE CBC W/AUTO DIFF WBC: CPT | Performed by: NURSE PRACTITIONER

## 2024-08-27 PROCEDURE — 82670 ASSAY OF TOTAL ESTRADIOL: CPT | Performed by: NURSE PRACTITIONER

## 2024-08-27 PROCEDURE — 83036 HEMOGLOBIN GLYCOSYLATED A1C: CPT | Performed by: NURSE PRACTITIONER

## 2024-08-27 PROCEDURE — 84481 FREE ASSAY (FT-3): CPT | Performed by: NURSE PRACTITIONER

## 2024-08-27 PROCEDURE — 84443 ASSAY THYROID STIM HORMONE: CPT | Performed by: NURSE PRACTITIONER

## 2024-08-27 PROCEDURE — 84439 ASSAY OF FREE THYROXINE: CPT | Performed by: NURSE PRACTITIONER

## 2024-08-27 PROCEDURE — 84403 ASSAY OF TOTAL TESTOSTERONE: CPT | Performed by: NURSE PRACTITIONER

## 2024-08-27 PROCEDURE — 82627 DEHYDROEPIANDROSTERONE: CPT | Performed by: NURSE PRACTITIONER

## 2024-08-27 PROCEDURE — 80061 LIPID PANEL: CPT | Performed by: NURSE PRACTITIONER

## 2024-08-27 PROCEDURE — 82306 VITAMIN D 25 HYDROXY: CPT | Performed by: NURSE PRACTITIONER

## 2024-08-27 PROCEDURE — G0145 SCR C/V CYTO,THINLAYER,RESCR: HCPCS | Performed by: NURSE PRACTITIONER

## 2024-08-27 PROCEDURE — 80053 COMPREHEN METABOLIC PANEL: CPT | Performed by: NURSE PRACTITIONER

## 2024-08-27 PROCEDURE — 36415 COLL VENOUS BLD VENIPUNCTURE: CPT | Performed by: NURSE PRACTITIONER

## 2024-08-27 ASSESSMENT — ENCOUNTER SYMPTOMS
RESPIRATORY NEGATIVE: 1
CARDIOVASCULAR NEGATIVE: 1
CONSTITUTIONAL NEGATIVE: 1
GASTROINTESTINAL NEGATIVE: 1

## 2024-08-27 NOTE — PROGRESS NOTES
Preventive Care Visit  [facility]  Bette Rey, KYLIE, Internal Medicine  Aug 27, 2024      Assessment & Plan     Encounter for preventive health examination  Counseling: counseling provided regarding nutrition, regular exercise, general safety and periodic health exams   Pap done today  Labs done today  Immunizations up to date    - Estradiol; Future  - Progesterone; Future  - Testosterone Free and Total; Future  - DHEA sulfate; Future  - Sex Hormone Binding Globulin; Future  - TSH; Future  - T3, Free; Future  - T4, free; Future  - Vitamin D Deficiency; Future  - Comprehensive metabolic panel (BMP + Alb, Alk Phos, ALT, AST, Total. Bili, TP); Future  - CBC with platelets and differential; Future  - Lipid panel reflex to direct LDL Fasting; Future  - Estradiol  - Progesterone  - Testosterone Free and Total  - DHEA sulfate  - Sex Hormone Binding Globulin  - TSH  - T3, Free  - T4, free  - Vitamin D Deficiency  - Comprehensive metabolic panel (BMP + Alb, Alk Phos, ALT, AST, Total. Bili, TP)  - CBC with platelets and differential  - Lipid panel reflex to direct LDL Fasting    PCOS (polycystic ovarian syndrome)  -sees gynecology and holistic provider, previously on medications but currently doing well with diet/lifestyle modifications    - Estradiol; Future  - Progesterone; Future  - Testosterone Free and Total; Future  - DHEA sulfate; Future  - Sex Hormone Binding Globulin; Future  - TSH; Future  - T3, Free; Future  - T4, free; Future  - Estradiol  - Progesterone  - Testosterone Free and Total  - DHEA sulfate  - Sex Hormone Binding Globulin  - TSH  - T3, Free  - T4, free    Endometriosis  -sees gynecology and holistic provider, previously on medications but currently doing well with diet/lifestyle modifications    - Estradiol; Future  - Progesterone; Future  - Testosterone Free and Total; Future  - DHEA sulfate; Future  - Sex Hormone Binding Globulin; Future  - TSH; Future  - T3, Free; Future  -  "T4, free; Future  - Estradiol  - Progesterone  - Testosterone Free and Total  - DHEA sulfate  - Sex Hormone Binding Globulin  - TSH  - T3, Free  - T4, free    Screening for cervical cancer    - Gynecologic Cytology (PAP Smear)        BMI  Estimated body mass index is 28.56 kg/m  as calculated from the following:    Height as of this encounter: 1.6 m (5' 3\").    Weight as of this encounter: 73.1 kg (161 lb 3.2 oz).       Counseling  Appropriate preventive services were addressed with this patient via screening, questionnaire, or discussion as appropriate for fall prevention, nutrition, physical activity, Tobacco-use cessation, social engagement, weight loss and cognition.  Checklist reviewing preventive services available has been given to the patient.  Reviewed patient's diet, addressing concerns and/or questions.           Subjective   Gaby is a 24 year old, presenting for the following:  Physical (fasting)        8/27/2024     7:42 AM   Additional Questions   Roomed by azamit   Accompanied by self         8/27/2024     7:42 AM   Patient Reported Additional Medications   Patient reports taking the following new medications none        Health Care Directive  Patient does not have a Health Care Directive or Living Will: Discussed advance care planning with patient; information given to patient to review.    Gaby presents to the clinic today for annual physical exam.  She reports overall she is feeling well.  She does have history of endometriosis and PCOS.  She has previously been on hormones for this and most recently was on Orilissa.                    8/22/2024   General Health   How would you rate your overall physical health? Good   Feel stress (tense, anxious, or unable to sleep) Not at all            8/22/2024   Nutrition   Three or more servings of calcium each day? Yes   Diet: Regular (no restrictions)   How many servings of fruit and vegetables per day? (!) 2-3   How many sweetened beverages each day? 0-1 "            8/22/2024   Exercise   Days per week of moderate/strenous exercise 5 days   Average minutes spent exercising at this level 30 min            8/22/2024   Social Factors   Frequency of gathering with friends or relatives More than three times a week   Worry food won't last until get money to buy more No   Food not last or not have enough money for food? No   Do you have housing? (Housing is defined as stable permanent housing and does not include staying ouside in a car, in a tent, in an abandoned building, in an overnight shelter, or couch-surfing.) Yes   Are you worried about losing your housing? No   Lack of transportation? No   Unable to get utilities (heat,electricity)? No            8/22/2024   Dental   Dentist two times every year? Yes            8/22/2024   TB Screening   Were you born outside of the [place]? No            Today's PHQ-2 Score:       8/27/2024    12:08 AM   PHQ-2 ( 1999 [employer])   Q1: Little interest or pleasure in doing things 0   Q2: Feeling down, depressed or hopeless 0   PHQ-2 Score 0   Q1: Little interest or pleasure in doing things Not at all   Q2: Feeling down, depressed or hopeless Not at all   PHQ-2 Score 0           8/22/2024   Substance Use   Alcohol more than 3/day or more than 7/wk No   Do you use any other substances recreationally? No        Social History     Tobacco Use    Smoking status: Never    Smokeless tobacco: Never   Vaping Use    Vaping status: Never Used   Substance Use Topics    Alcohol use: Yes     Comment: occ    Drug use: No                  8/22/2024   STI Screening   New sexual partner(s) since last STI/HIV test? No        History of abnormal Pap smear: No - age 21-29 PAP every 3 years recommended        2/9/2021    10:07 AM   PAP / HPV   PAP (Historical) NIL            8/22/2024   Contraception/Family Planning   Questions about contraception or family planning No           Reviewed and updated as needed this visit by Provider   Tobacco  Allergies  Meds   "Problems  Med Hx  Surg Hx  Fam Hx            Past Medical History:   Diagnosis Date    Chicken pox     Shingles      Past Surgical History:   Procedure Laterality Date    NO HISTORY OF SURGERY         Review of Systems   Constitutional: Negative.    HENT: Negative.     Respiratory: Negative.     Cardiovascular: Negative.    Gastrointestinal: Negative.    Genitourinary: Negative.           Objective    Exam  /68 (BP Location: Right arm, Patient Position: Sitting, Cuff Size: Adult Regular)   Pulse 110   Temp 98.4  F (36.9  C) (Oral)   Resp 16   Ht 1.6 m (5' 3\")   Wt 73.1 kg (161 lb 3.2 oz)   LMP 08/13/2024   SpO2 99%   BMI 28.56 kg/m     Estimated body mass index is 28.56 kg/m  as calculated from the following:    Height as of this encounter: 1.6 m (5' 3\").    Weight as of this encounter: 73.1 kg (161 lb 3.2 oz).    Physical Exam  Vitals and nursing note reviewed.   Constitutional:       General: She is not in acute distress.     Appearance: Normal appearance. She is not ill-appearing.   HENT:      Head: Normocephalic and atraumatic.      Right Ear: Tympanic membrane, ear canal and external ear normal.      Left Ear: Tympanic membrane, ear canal and external ear normal.      Nose: Nose normal.      Mouth/Throat:      Mouth: Mucous membranes are moist.      Pharynx: Oropharynx is clear.   Eyes:      Extraocular Movements: Extraocular movements intact.      Conjunctiva/sclera: Conjunctivae normal.      Pupils: Pupils are equal, round, and reactive to light.   Cardiovascular:      Rate and Rhythm: Normal rate and regular rhythm.      Pulses: Normal pulses.      Heart sounds: Normal heart sounds.   Pulmonary:      Effort: Pulmonary effort is normal.      Breath sounds: Normal breath sounds.   Abdominal:      General: Bowel sounds are normal. There is no distension.      Palpations: Abdomen is soft. There is no mass.      Tenderness: There is no abdominal tenderness. There is no guarding. "   Genitourinary:     General: Normal vulva.      Vagina: Normal.      Cervix: Normal.      Uterus: Normal.       Adnexa: Right adnexa normal and left adnexa normal.   Musculoskeletal:         General: Normal range of motion.      Cervical back: Normal range of motion.   Skin:     General: Skin is warm and dry.   Neurological:      General: No focal deficit present.      Mental Status: She is alert and oriented to person, place, and time. Mental status is at baseline.   Psychiatric:         Mood and Affect: Mood normal.         Behavior: Behavior normal.               Signed Electronically by: Bette Rey, CNP

## 2024-08-28 LAB — DHEA-S SERPL-MCNC: 229 UG/DL (ref 35–430)

## 2024-08-29 PROBLEM — E03.8 SUBCLINICAL HYPOTHYROIDISM: Status: ACTIVE | Noted: 2024-08-29

## 2024-08-29 LAB — HBA1C MFR BLD: 5.2 % (ref 0–5.6)

## 2024-08-30 LAB
BKR LAB AP GYN ADEQUACY: NORMAL
BKR LAB AP GYN INTERPRETATION: NORMAL
BKR LAB AP HPV REFLEX: NORMAL
BKR LAB AP LMP: NORMAL
BKR LAB AP PREVIOUS ABNORMAL: NORMAL
PATH REPORT.COMMENTS IMP SPEC: NORMAL
PATH REPORT.COMMENTS IMP SPEC: NORMAL
PATH REPORT.RELEVANT HX SPEC: NORMAL
TESTOST FREE SERPL-MCNC: 0.79 NG/DL
TESTOST SERPL-MCNC: 64 NG/DL (ref 8–60)

## 2025-01-10 ENCOUNTER — TRANSFERRED RECORDS (OUTPATIENT)
Dept: HEALTH INFORMATION MANAGEMENT | Facility: CLINIC | Age: 25
End: 2025-01-10
Payer: COMMERCIAL

## 2025-01-16 ENCOUNTER — MYC MEDICAL ADVICE (OUTPATIENT)
Dept: INTERNAL MEDICINE | Facility: CLINIC | Age: 25
End: 2025-01-16
Payer: COMMERCIAL

## 2025-07-29 ENCOUNTER — PATIENT OUTREACH (OUTPATIENT)
Dept: CARE COORDINATION | Facility: CLINIC | Age: 25
End: 2025-07-29
Payer: COMMERCIAL